# Patient Record
Sex: MALE | Race: WHITE | NOT HISPANIC OR LATINO | Employment: FULL TIME | URBAN - METROPOLITAN AREA
[De-identification: names, ages, dates, MRNs, and addresses within clinical notes are randomized per-mention and may not be internally consistent; named-entity substitution may affect disease eponyms.]

---

## 2017-05-09 ENCOUNTER — HOSPITAL ENCOUNTER (OUTPATIENT)
Dept: LAB | Facility: MEDICAL CENTER | Age: 67
End: 2017-05-09
Attending: OPTOMETRIST
Payer: COMMERCIAL

## 2017-05-09 LAB
ALBUMIN SERPL BCP-MCNC: 4.5 G/DL (ref 3.2–4.9)
ALBUMIN/GLOB SERPL: 1.3 G/DL
ALP SERPL-CCNC: 64 U/L (ref 30–99)
ALT SERPL-CCNC: 42 U/L (ref 2–50)
ANION GAP SERPL CALC-SCNC: 8 MMOL/L (ref 0–11.9)
AST SERPL-CCNC: 28 U/L (ref 12–45)
BASOPHILS # BLD AUTO: 0.6 % (ref 0–1.8)
BASOPHILS # BLD: 0.05 K/UL (ref 0–0.12)
BILIRUB SERPL-MCNC: 0.7 MG/DL (ref 0.1–1.5)
BUN SERPL-MCNC: 20 MG/DL (ref 8–22)
CALCIUM SERPL-MCNC: 9.9 MG/DL (ref 8.5–10.5)
CHLORIDE SERPL-SCNC: 105 MMOL/L (ref 96–112)
CO2 SERPL-SCNC: 24 MMOL/L (ref 20–33)
CREAT SERPL-MCNC: 0.94 MG/DL (ref 0.5–1.4)
EOSINOPHIL # BLD AUTO: 0.08 K/UL (ref 0–0.51)
EOSINOPHIL NFR BLD: 0.9 % (ref 0–6.9)
ERYTHROCYTE [DISTWIDTH] IN BLOOD BY AUTOMATED COUNT: 42.6 FL (ref 35.9–50)
ERYTHROCYTE [SEDIMENTATION RATE] IN BLOOD BY WESTERGREN METHOD: 0 MM/HOUR (ref 0–20)
EST. AVERAGE GLUCOSE BLD GHB EST-MCNC: 134 MG/DL
GFR SERPL CREATININE-BSD FRML MDRD: >60 ML/MIN/1.73 M 2
GLOBULIN SER CALC-MCNC: 3.5 G/DL (ref 1.9–3.5)
GLUCOSE SERPL-MCNC: 92 MG/DL (ref 65–99)
HBA1C MFR BLD: 6.3 % (ref 0–5.6)
HCT VFR BLD AUTO: 50.3 % (ref 42–52)
HGB BLD-MCNC: 16.3 G/DL (ref 14–18)
IMM GRANULOCYTES # BLD AUTO: 0.02 K/UL (ref 0–0.11)
IMM GRANULOCYTES NFR BLD AUTO: 0.2 % (ref 0–0.9)
LYMPHOCYTES # BLD AUTO: 2.51 K/UL (ref 1–4.8)
LYMPHOCYTES NFR BLD: 28 % (ref 22–41)
MCH RBC QN AUTO: 28.5 PG (ref 27–33)
MCHC RBC AUTO-ENTMCNC: 32.4 G/DL (ref 33.7–35.3)
MCV RBC AUTO: 87.9 FL (ref 81.4–97.8)
MONOCYTES # BLD AUTO: 0.67 K/UL (ref 0–0.85)
MONOCYTES NFR BLD AUTO: 7.5 % (ref 0–13.4)
NEUTROPHILS # BLD AUTO: 5.65 K/UL (ref 1.82–7.42)
NEUTROPHILS NFR BLD: 62.8 % (ref 44–72)
NRBC # BLD AUTO: 0 K/UL
NRBC BLD AUTO-RTO: 0 /100 WBC
PLATELET # BLD AUTO: 288 K/UL (ref 164–446)
PMV BLD AUTO: 10.7 FL (ref 9–12.9)
POTASSIUM SERPL-SCNC: 4.5 MMOL/L (ref 3.6–5.5)
PROT SERPL-MCNC: 8 G/DL (ref 6–8.2)
RBC # BLD AUTO: 5.72 M/UL (ref 4.7–6.1)
SODIUM SERPL-SCNC: 137 MMOL/L (ref 135–145)
WBC # BLD AUTO: 9 K/UL (ref 4.8–10.8)

## 2017-05-09 PROCEDURE — 85025 COMPLETE CBC W/AUTO DIFF WBC: CPT

## 2017-05-09 PROCEDURE — 80053 COMPREHEN METABOLIC PANEL: CPT

## 2017-05-09 PROCEDURE — 83036 HEMOGLOBIN GLYCOSYLATED A1C: CPT

## 2017-05-09 PROCEDURE — 36415 COLL VENOUS BLD VENIPUNCTURE: CPT

## 2017-05-09 PROCEDURE — 85652 RBC SED RATE AUTOMATED: CPT

## 2017-09-16 ENCOUNTER — HOSPITAL ENCOUNTER (OUTPATIENT)
Facility: MEDICAL CENTER | Age: 67
End: 2017-09-16
Payer: COMMERCIAL

## 2017-09-16 LAB
ALBUMIN SERPL BCP-MCNC: 4.3 G/DL (ref 3.2–4.9)
ALBUMIN/GLOB SERPL: 1.3 G/DL
ALP SERPL-CCNC: 73 U/L (ref 30–99)
ALT SERPL-CCNC: 41 U/L (ref 2–50)
ANION GAP SERPL CALC-SCNC: 8 MMOL/L (ref 0–11.9)
AST SERPL-CCNC: 25 U/L (ref 12–45)
BDY FAT % MEASURED: 33 %
BILIRUB SERPL-MCNC: 0.7 MG/DL (ref 0.1–1.5)
BP DIAS: 80 MMHG
BP SYS: 140 MMHG
BUN SERPL-MCNC: 13 MG/DL (ref 8–22)
CALCIUM SERPL-MCNC: 9.3 MG/DL (ref 8.5–10.5)
CHLORIDE SERPL-SCNC: 107 MMOL/L (ref 96–112)
CHOLEST SERPL-MCNC: 204 MG/DL (ref 100–199)
CO2 SERPL-SCNC: 23 MMOL/L (ref 20–33)
CREAT SERPL-MCNC: 0.92 MG/DL (ref 0.5–1.4)
DIABETES HTDIA: NO
EVENT NAME HTEVT: NORMAL
GFR SERPL CREATININE-BSD FRML MDRD: >60 ML/MIN/1.73 M 2
GLOBULIN SER CALC-MCNC: 3.3 G/DL (ref 1.9–3.5)
GLUCOSE SERPL-MCNC: 119 MG/DL (ref 65–99)
HDLC SERPL-MCNC: 44 MG/DL
HYPERTENSION HTHYP: NO
LDLC SERPL CALC-MCNC: 135 MG/DL
POTASSIUM SERPL-SCNC: 4.3 MMOL/L (ref 3.6–5.5)
PROT SERPL-MCNC: 7.6 G/DL (ref 6–8.2)
SCREENING LOC CITY HTCIT: NORMAL
SCREENING LOC STATE HTSTA: NORMAL
SCREENING LOCATION HTLOC: NORMAL
SODIUM SERPL-SCNC: 138 MMOL/L (ref 135–145)
SUBSCRIBER ID HTSID: NORMAL
TRIGL SERPL-MCNC: 125 MG/DL (ref 0–149)

## 2018-02-17 ENCOUNTER — OFFICE VISIT (OUTPATIENT)
Dept: URGENT CARE | Facility: CLINIC | Age: 68
End: 2018-02-17
Payer: COMMERCIAL

## 2018-02-17 VITALS
RESPIRATION RATE: 16 BRPM | OXYGEN SATURATION: 96 % | BODY MASS INDEX: 33.59 KG/M2 | TEMPERATURE: 98.2 F | HEIGHT: 72 IN | HEART RATE: 84 BPM | DIASTOLIC BLOOD PRESSURE: 82 MMHG | WEIGHT: 248 LBS | SYSTOLIC BLOOD PRESSURE: 128 MMHG

## 2018-02-17 DIAGNOSIS — J40 BRONCHITIS: ICD-10-CM

## 2018-02-17 PROCEDURE — 99203 OFFICE O/P NEW LOW 30 MIN: CPT | Performed by: PHYSICIAN ASSISTANT

## 2018-02-17 RX ORDER — PROMETHAZINE HYDROCHLORIDE AND CODEINE PHOSPHATE 6.25; 1 MG/5ML; MG/5ML
5-10 SYRUP ORAL 3 TIMES DAILY PRN
Qty: 150 ML | Refills: 0 | Status: SHIPPED | OUTPATIENT
Start: 2018-02-17 | End: 2018-02-22

## 2018-02-17 RX ORDER — CLARITHROMYCIN 500 MG/1
500 TABLET, COATED ORAL 2 TIMES DAILY
Qty: 20 TAB | Refills: 0 | Status: SHIPPED | OUTPATIENT
Start: 2018-02-17 | End: 2018-02-27

## 2018-02-17 ASSESSMENT — ENCOUNTER SYMPTOMS
CARDIOVASCULAR NEGATIVE: 1
SPUTUM PRODUCTION: 1
COUGH: 1
SORE THROAT: 0
EYES NEGATIVE: 1
SHORTNESS OF BREATH: 0
FEVER: 0
CONSTITUTIONAL NEGATIVE: 1

## 2018-02-17 NOTE — PROGRESS NOTES
Subjective:      Noemi Padron is a 67 y.o. male who presents with Cough (x 3 days, some productive cough, little chest congestion and little chills)            Cough   This is a new problem. The current episode started in the past 7 days. The problem has been unchanged. The problem occurs every few minutes. The cough is productive of sputum. Pertinent negatives include no fever, sore throat or shortness of breath. Nothing aggravates the symptoms. He has tried nothing for the symptoms. The treatment provided no relief. There is no history of asthma.       Review of Systems   Constitutional: Negative.  Negative for fever.   HENT: Negative.  Negative for sore throat.    Eyes: Negative.    Respiratory: Positive for cough and sputum production. Negative for shortness of breath.    Cardiovascular: Negative.    Skin: Negative.           Objective:     /82   Pulse 84   Temp 36.8 °C (98.2 °F)   Resp 16   Ht 1.829 m (6')   Wt 112.5 kg (248 lb)   SpO2 96%   BMI 33.63 kg/m²      Physical Exam   Constitutional: He is oriented to person, place, and time. He appears well-developed and well-nourished. No distress.   HENT:   Head: Normocephalic and atraumatic.   Mouth/Throat: Oropharynx is clear and moist.   Eyes: EOM are normal. Pupils are equal, round, and reactive to light.   Neck: Normal range of motion. Neck supple.   Cardiovascular: Normal rate.    Pulmonary/Chest: Effort normal and breath sounds normal. No respiratory distress. He has no wheezes. He has no rales.   Lymphadenopathy:     He has no cervical adenopathy.   Neurological: He is alert and oriented to person, place, and time.   Skin: Skin is warm and dry.   Psychiatric: He has a normal mood and affect. His behavior is normal.   Nursing note and vitals reviewed.    Vitals:    02/17/18 1146   BP: 128/82   Pulse: 84   Resp: 16   Temp: 36.8 °C (98.2 °F)   SpO2: 96%   Weight: 112.5 kg (248 lb)   Height: 1.829 m (6')     Active Ambulatory Problems      Diagnosis Date Noted   • No Active Ambulatory Problems     Resolved Ambulatory Problems     Diagnosis Date Noted   • No Resolved Ambulatory Problems     No Additional Past Medical History     No current outpatient prescriptions on file prior to visit.     No current facility-administered medications on file prior to visit.      Social History     Social History   • Marital status:      Spouse name: N/A   • Number of children: N/A   • Years of education: N/A     Occupational History   • Not on file.     Social History Main Topics   • Smoking status: Not on file   • Smokeless tobacco: Not on file   • Alcohol use Not on file   • Drug use: Unknown   • Sexual activity: Not on file     Other Topics Concern   • Not on file     Social History Narrative   • No narrative on file     History reviewed. No pertinent family history.  Patient has no known allergies.              Assessment/Plan:     ·  bronchitis      · rx abx; otc prn

## 2018-03-28 ENCOUNTER — OFFICE VISIT (OUTPATIENT)
Dept: URGENT CARE | Facility: CLINIC | Age: 68
End: 2018-03-28
Payer: COMMERCIAL

## 2018-03-28 VITALS
DIASTOLIC BLOOD PRESSURE: 78 MMHG | OXYGEN SATURATION: 97 % | HEART RATE: 84 BPM | SYSTOLIC BLOOD PRESSURE: 118 MMHG | HEIGHT: 72 IN | WEIGHT: 223.55 LBS | TEMPERATURE: 98.2 F | RESPIRATION RATE: 20 BRPM | BODY MASS INDEX: 30.28 KG/M2

## 2018-03-28 DIAGNOSIS — L08.9 FINGER INFECTION: ICD-10-CM

## 2018-03-28 PROCEDURE — 99213 OFFICE O/P EST LOW 20 MIN: CPT | Performed by: PHYSICIAN ASSISTANT

## 2018-03-28 RX ORDER — MOXIFLOXACIN HYDROCHLORIDE 400 MG/1
400 TABLET ORAL DAILY
Qty: 3 TAB | Refills: 0 | Status: SHIPPED | OUTPATIENT
Start: 2018-03-28 | End: 2018-03-31

## 2018-03-28 RX ORDER — CLINDAMYCIN HYDROCHLORIDE 300 MG/1
300 CAPSULE ORAL 3 TIMES DAILY
Qty: 21 CAP | Refills: 0 | Status: SHIPPED | OUTPATIENT
Start: 2018-03-28 | End: 2018-04-04

## 2018-03-28 ASSESSMENT — ENCOUNTER SYMPTOMS
WEAKNESS: 0
SWOLLEN GLANDS: 0
FEVER: 0
JOINT SWELLING: 0
MUSCULOSKELETAL NEGATIVE: 1
NUMBNESS: 0
CONSTITUTIONAL NEGATIVE: 1
NEUROLOGICAL NEGATIVE: 1

## 2018-03-28 NOTE — PROGRESS NOTES
Subjective:      Noemi Padron is a 67 y.o. male who presents with Finger Pain (posibble infection)            Other   This is a new problem. The current episode started in the past 7 days (finger infection). The problem occurs constantly. The problem has been unchanged. Pertinent negatives include no fever, joint swelling, numbness, swollen glands or weakness. The symptoms are aggravated by bending. He has tried rest for the symptoms. The treatment provided no relief.       Review of Systems   Constitutional: Negative.  Negative for fever.   Musculoskeletal: Negative.  Negative for joint swelling.   Skin: Negative.    Neurological: Negative.  Negative for weakness and numbness.          Objective:     There were no vitals taken for this visit.     Physical Exam   Constitutional: He is oriented to person, place, and time. He appears well-developed and well-nourished. No distress.   Musculoskeletal: Normal range of motion. He exhibits edema and tenderness.   3rd finger dorsal pip jnt, redn/sw/tend; 1%lidoc local; poked w/18ga, no pus, just bld; abx dssg   Neurological: He is alert and oriented to person, place, and time. He exhibits normal muscle tone. Coordination normal.   Skin: Skin is warm and dry. There is erythema.   Psychiatric: He has a normal mood and affect. His behavior is normal.   Nursing note and vitals reviewed.    Active Ambulatory Problems     Diagnosis Date Noted   • No Active Ambulatory Problems     Resolved Ambulatory Problems     Diagnosis Date Noted   • No Resolved Ambulatory Problems     No Additional Past Medical History     No current outpatient prescriptions on file prior to visit.     No current facility-administered medications on file prior to visit.      Social History     Social History   • Marital status:      Spouse name: N/A   • Number of children: N/A   • Years of education: N/A     Occupational History   • Not on file.     Social History Main Topics   • Smoking status:  Never Smoker   • Smokeless tobacco: Never Used   • Alcohol use No   • Drug use: No   • Sexual activity: Not on file     Other Topics Concern   • Not on file     Social History Narrative   • No narrative on file     History reviewed. No pertinent family history.  Patient has no known allergies.            Assessment/Plan:     ·  finger inf      · rx abx, soaks

## 2021-03-03 DIAGNOSIS — Z23 NEED FOR VACCINATION: ICD-10-CM

## 2021-03-10 ENCOUNTER — OFFICE VISIT (OUTPATIENT)
Dept: URGENT CARE | Facility: CLINIC | Age: 71
End: 2021-03-10
Payer: COMMERCIAL

## 2021-03-10 VITALS
HEIGHT: 72 IN | RESPIRATION RATE: 18 BRPM | OXYGEN SATURATION: 96 % | WEIGHT: 224 LBS | DIASTOLIC BLOOD PRESSURE: 94 MMHG | SYSTOLIC BLOOD PRESSURE: 172 MMHG | BODY MASS INDEX: 30.34 KG/M2 | TEMPERATURE: 97.1 F | HEART RATE: 78 BPM

## 2021-03-10 DIAGNOSIS — G51.0 BELL'S PALSY: ICD-10-CM

## 2021-03-10 DIAGNOSIS — R03.0 ELEVATED BLOOD-PRESSURE READING WITHOUT DIAGNOSIS OF HYPERTENSION: ICD-10-CM

## 2021-03-10 PROCEDURE — 99214 OFFICE O/P EST MOD 30 MIN: CPT | Performed by: STUDENT IN AN ORGANIZED HEALTH CARE EDUCATION/TRAINING PROGRAM

## 2021-03-10 RX ORDER — PREDNISONE 20 MG/1
TABLET ORAL
Qty: 20 TABLET | Refills: 0 | Status: SHIPPED | OUTPATIENT
Start: 2021-03-10 | End: 2024-02-14

## 2021-03-10 NOTE — PROGRESS NOTES
Subjective:   CHIEF COMPLAINT  Chief Complaint   Patient presents with   • Facial Droop     x1day, left side of face, drooping, no muscle control       HPI  Noemi Padron is a 70 y.o. male who presents with a chief complaint of left sided facial weakness, including his forehead, which started yesterday.  Patient says he has been experiencing some difficulty eating and drinking with some diminished sense of taste on left side.  He has been having to manually close his left eye.  The patient wears hearing aids but has not had any changes or loss of hearing.  No problems with speech.  No changes in vision.  No weakness or loss of sensation in his bilateral upper or lower extremities.  No new rashes.    Additionally the patient had elevated blood pressure on arrival.  Patient denies a previous history of hypertension but does not have a PCP, would like to establish.    REVIEW OF SYSTEMS  General: no fever or chills  GI: no nausea or vomiting  See HPI for further details.    PAST MEDICAL HISTORY  There are no problems to display for this patient.      SURGICAL HISTORY  patient denies any surgical history    ALLERGIES  No Known Allergies    CURRENT MEDICATIONS  Home Medications     Reviewed by George Bob D.O. (Physician) on 03/10/21 at [email or web address]  Med List Status: <None>   Medication Last Dose Status        Patient Jun Taking any Medications                       SOCIAL HISTORY  Social History     Tobacco Use   • Smoking status: Never Smoker   • Smokeless tobacco: Never Used   Substance and Sexual Activity   • Alcohol use: No   • Drug use: No   • Sexual activity: Not on file       FAMILY HISTORY  No family history on file.       Objective:   PHYSICAL EXAM  VITAL SIGNS: BP (!) 172/94 (BP Location: Left arm, Patient Position: Sitting, BP Cuff Size: Adult)   Pulse 78   Temp 36.2 °C (97.1 °F) (Temporal)   Resp 18   Ht 1.829 m (6')   Wt 102 kg (224 lb)   SpO2 96%   BMI 30.38 kg/m²     Gen: no acute distress,  "normal voice  Skin: dry, intact, moist mucosal membranes  ENT: No lesions or rashes in the external ear canals bilaterally.  TMs clear and intact.  Lungs: CTAB w/ symmetric expansion  CV: RRR w/o murmurs or clicks  Neuro: Speech: conversant, fluent, no aphasia.  Able to repeat \"the lara is blue.\"  Mental status: AAOx3  Gait: Antalgic 2/2 right knee DJD (planning to get TKA next week).  Gait was steady.  CN: Left ipsilateral cranial nerve VII dysfunction.  Remaining cranial nerves II through XII were fully intact.    Sensory exam: globally intact bilateral upper and lower extremities  Motor exam: no global deficits bilateral upper and lower extremities  Psych: normal affect, normal judgement, alert, awake    Assessment/Plan:     1. Bell's palsy  predniSONE (DELTASONE) 20 MG Tab    REFERRAL TO FOLLOW-UP WITH PRIMARY CARE   2. 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  Remaining neurologic exam was completely unremarkable.  No additional peripheral or any central neurologic deficits.  -Rx sent for prednisone 20 mg twice daily x10 days  -Eyelid was taped down in the clinic and provided 2 rolls of tape  -Instructed to purchase GenTeal eye gel and to use daily and before going to bed  -Instructed to purchase an eye patch  -Discussed red flags and emergency room precautions.  Patient verbalized understanding.  All questions were answered.    2) pressure elevated today.  The patient reports no previous history of hypertension.  He is not established with a PCP and would like referral  -Referral sent to establish with primary care      Differential diagnosis, natural history, supportive care, and indications for immediate follow-up discussed. All questions answered. Patient agrees with the plan of care.    Follow-up as needed if symptoms worsen or fail to improve to PCP, " Urgent care or Emergency Room.    Please note that this dictation was created using voice recognition software. I have made a reasonable attempt to correct obvious errors, but I expect that there are errors of grammar and possibly content that I did not discover before finalizing the note.

## 2021-03-26 ENCOUNTER — HOSPITAL ENCOUNTER (OUTPATIENT)
Dept: LAB | Facility: MEDICAL CENTER | Age: 71
End: 2021-03-26
Attending: ANESTHESIOLOGY
Payer: COMMERCIAL

## 2021-03-26 LAB
COVID ORDER STATUS COVID19: NORMAL
SARS-COV-2 RNA RESP QL NAA+PROBE: NOTDETECTED
SPECIMEN SOURCE: NORMAL

## 2021-03-26 PROCEDURE — C9803 HOPD COVID-19 SPEC COLLECT: HCPCS

## 2021-03-26 PROCEDURE — U0003 INFECTIOUS AGENT DETECTION BY NUCLEIC ACID (DNA OR RNA); SEVERE ACUTE RESPIRATORY SYNDROME CORONAVIRUS 2 (SARS-COV-2) (CORONAVIRUS DISEASE [COVID-19]), AMPLIFIED PROBE TECHNIQUE, MAKING USE OF HIGH THROUGHPUT TECHNOLOGIES AS DESCRIBED BY CMS-2020-01-R: HCPCS

## 2021-03-26 PROCEDURE — U0005 INFEC AGEN DETEC AMPLI PROBE: HCPCS

## 2021-04-04 ENCOUNTER — HOSPITAL ENCOUNTER (EMERGENCY)
Facility: MEDICAL CENTER | Age: 71
End: 2021-04-04
Attending: EMERGENCY MEDICINE
Payer: COMMERCIAL

## 2021-04-04 ENCOUNTER — APPOINTMENT (OUTPATIENT)
Dept: RADIOLOGY | Facility: MEDICAL CENTER | Age: 71
End: 2021-04-04
Attending: EMERGENCY MEDICINE
Payer: COMMERCIAL

## 2021-04-04 VITALS
WEIGHT: 222.66 LBS | OXYGEN SATURATION: 98 % | HEIGHT: 72 IN | RESPIRATION RATE: 16 BRPM | HEART RATE: 68 BPM | TEMPERATURE: 98.2 F | DIASTOLIC BLOOD PRESSURE: 73 MMHG | BODY MASS INDEX: 30.16 KG/M2 | SYSTOLIC BLOOD PRESSURE: 155 MMHG

## 2021-04-04 DIAGNOSIS — G89.18 POST-OP PAIN: ICD-10-CM

## 2021-04-04 PROCEDURE — 93971 EXTREMITY STUDY: CPT | Mod: 26 | Performed by: INTERNAL MEDICINE

## 2021-04-04 PROCEDURE — 93971 EXTREMITY STUDY: CPT | Mod: RT

## 2021-04-04 PROCEDURE — 700111 HCHG RX REV CODE 636 W/ 250 OVERRIDE (IP): Performed by: EMERGENCY MEDICINE

## 2021-04-04 PROCEDURE — 99284 EMERGENCY DEPT VISIT MOD MDM: CPT | Mod: EDC

## 2021-04-04 RX ORDER — OXYCODONE AND ACETAMINOPHEN TABLETS 5; 300 MG/1; MG/1
1 TABLET ORAL EVERY 4 HOURS PRN
Status: SHIPPED | COMMUNITY
End: 2024-02-14

## 2021-04-04 RX ORDER — MELOXICAM 7.5 MG/1
7.5 TABLET ORAL DAILY
Status: SHIPPED | COMMUNITY
End: 2024-02-14

## 2021-04-04 RX ORDER — ASPIRIN 81 MG/1
81 TABLET, CHEWABLE ORAL DAILY
Status: SHIPPED | COMMUNITY
End: 2024-02-14

## 2021-04-04 RX ADMIN — FENTANYL CITRATE 50 MCG: 50 INJECTION, SOLUTION INTRAMUSCULAR; INTRAVENOUS at 09:12

## 2021-04-04 NOTE — ED NOTES
Rounded with patient, states that he is comfortable and that he is currently not in pain.  He and his wife deny needs at this time.  Call light in place.

## 2021-04-04 NOTE — ED NOTES
Leroy Padron has been discharged from the Emergency Room.    Discharge instructions, which include signs and symptoms to monitor at home for, as well as detailed information regarding post op pain provided.  All questions and concerns addressed at this time.      Patient leaves ER in no apparent distress. This RN provided education regarding returning to the ER for any new concerns or changes in patient's condition.      /73   Pulse 68   Temp 36.8 °C (98.2 °F) (Temporal)   Resp 16   Ht 1.829 m (6')   Wt 101 kg (222 lb 10.6 oz)   SpO2 98%   BMI 30.20 kg/m²

## 2021-04-04 NOTE — ED PROVIDER NOTES
ED Provider Note    CHIEF COMPLAINT  Chief Complaint   Patient presents with   • Post-Op Complications     total right knee replacement on friday pt c/o increased pain and swelling to knee and calf        HPI  Leroy Padron is a 70 y.o. male who presents with right knee pain and swelling.  The patient had a total knee replacement on Friday.  He states he did well on Saturday but today has been having a lot of pain.  He also has some swelling distally.  He states the pain is worse with going into full extension.  He has not had any associated fevers.  He has not any cough nor difficulty with breathing.    REVIEW OF SYSTEMS  No fever, no nausea or vomiting    PHYSICAL EXAM  VITAL SIGNS: /86   Pulse 75   Temp 35.9 °C (96.6 °F) (Temporal)   Resp 18   Ht 1.829 m (6')   Wt 101 kg (222 lb 10.6 oz)   SpO2 93%   BMI 30.20 kg/m²   In general the patient does not appear toxic    Extremities the right knee is swollen with an apparent effusion.  He does have limited range of motion due to pain.  There is no skeletal deformities.    Skin no erythema nor induration    Neurovascular examination to the right lower extremity is grossly intact    RADIOLOGY/PROCEDURES  Ultrasound shows no evidence of a DVT    COURSE & MEDICAL DECISION MAKING  Pertinent Labs & Imaging studies reviewed. (See chart for details)  This a 70-year-old gentleman who presents with postoperative knee pain and swelling.  Clinically do not appreciate any evidence of an infection and based on the timeframe this would be less likely.  Ultrasound does not show any evidence of a DVT.  I suspect this is all postoperative and the patient had some decompensation as his nerve block has worn off.  The patient will continue his anti-inflammatories and hydrocodone for pain control.  The patient will return to the emerge department for increased pain, fever, or swelling.  He will follow up with his orthopedic surgeon as scheduled.    FINAL IMPRESSION  1.   Postoperative right knee pain       Disposition  The patient will be discharged in stable condition      Electronically signed by: Nuno Sahu M.D., 4/4/2021 9:06 AM

## 2021-04-04 NOTE — ED NOTES
"First interaction with patient.  Assumed care at this time.  Patient presents to the ER today for complaint of right knee pain and swelling.  He states that he had a total knee replacement by Dr. Hull 2 days ago.  He denies numbness or tingling to his right foot, states that his calf \"feels tight.\"  Denies shortness of breath or fever.    Gown provided, patient instructed to changed prior to ERP evaluation.  NPO status explained by this RN.  Call light provided.  Chart up for ERP.    This RN provided education to patient about the importance of keeping mask in place over both mouth and nose for entire duration of ER visit.  "

## 2021-04-04 NOTE — DISCHARGE INSTRUCTIONS
Continue your current pain medication and anti-inflammatories    Follow-up with your surgeon as scheduled

## 2021-04-04 NOTE — ED TRIAGE NOTES
Pt to triage .  Chief Complaint   Patient presents with   • Post-Op Complications     total right knee replacement on friday pt c/o increased pain and swelling to knee and calf

## 2022-06-18 ENCOUNTER — TELEPHONE (OUTPATIENT)
Dept: HEALTH INFORMATION MANAGEMENT | Facility: OTHER | Age: 72
End: 2022-06-18

## 2022-08-18 ENCOUNTER — TELEPHONE (OUTPATIENT)
Dept: SCHEDULING | Facility: IMAGING CENTER | Age: 72
End: 2022-08-18

## 2022-09-01 NOTE — TELEPHONE ENCOUNTER
Outcome: Left Message    Please transfer to Patient Outreach Team at [phone] when patient returns call.      Attempt # [phone]

## 2022-11-15 ENCOUNTER — DOCUMENTATION (OUTPATIENT)
Dept: HEALTH INFORMATION MANAGEMENT | Facility: OTHER | Age: 72
End: 2022-11-15

## 2023-12-26 ENCOUNTER — TELEPHONE (OUTPATIENT)
Dept: HEALTH INFORMATION MANAGEMENT | Facility: OTHER | Age: 73
End: 2023-12-26

## 2024-02-11 SDOH — ECONOMIC STABILITY: HOUSING INSECURITY
IN THE LAST 12 MONTHS, WAS THERE A TIME WHEN YOU DID NOT HAVE A STEADY PLACE TO SLEEP OR SLEPT IN A SHELTER (INCLUDING NOW)?: NO

## 2024-02-11 SDOH — ECONOMIC STABILITY: TRANSPORTATION INSECURITY
IN THE PAST 12 MONTHS, HAS LACK OF RELIABLE TRANSPORTATION KEPT YOU FROM MEDICAL APPOINTMENTS, MEETINGS, WORK OR FROM GETTING THINGS NEEDED FOR DAILY LIVING?: NO

## 2024-02-11 SDOH — ECONOMIC STABILITY: INCOME INSECURITY: IN THE LAST 12 MONTHS, WAS THERE A TIME WHEN YOU WERE NOT ABLE TO PAY THE MORTGAGE OR RENT ON TIME?: NO

## 2024-02-11 SDOH — HEALTH STABILITY: PHYSICAL HEALTH: ON AVERAGE, HOW MANY MINUTES DO YOU ENGAGE IN EXERCISE AT THIS LEVEL?: PATIENT DECLINED

## 2024-02-11 SDOH — ECONOMIC STABILITY: TRANSPORTATION INSECURITY
IN THE PAST 12 MONTHS, HAS THE LACK OF TRANSPORTATION KEPT YOU FROM MEDICAL APPOINTMENTS OR FROM GETTING MEDICATIONS?: NO

## 2024-02-11 SDOH — ECONOMIC STABILITY: FOOD INSECURITY: WITHIN THE PAST 12 MONTHS, THE FOOD YOU BOUGHT JUST DIDN'T LAST AND YOU DIDN'T HAVE MONEY TO GET MORE.: NEVER TRUE

## 2024-02-11 SDOH — ECONOMIC STABILITY: FOOD INSECURITY: WITHIN THE PAST 12 MONTHS, YOU WORRIED THAT YOUR FOOD WOULD RUN OUT BEFORE YOU GOT MONEY TO BUY MORE.: NEVER TRUE

## 2024-02-11 SDOH — HEALTH STABILITY: PHYSICAL HEALTH
ON AVERAGE, HOW MANY DAYS PER WEEK DO YOU ENGAGE IN MODERATE TO STRENUOUS EXERCISE (LIKE A BRISK WALK)?: PATIENT DECLINED

## 2024-02-11 SDOH — ECONOMIC STABILITY: INCOME INSECURITY: HOW HARD IS IT FOR YOU TO PAY FOR THE VERY BASICS LIKE FOOD, HOUSING, MEDICAL CARE, AND HEATING?: NOT HARD AT ALL

## 2024-02-11 SDOH — ECONOMIC STABILITY: TRANSPORTATION INSECURITY
IN THE PAST 12 MONTHS, HAS LACK OF TRANSPORTATION KEPT YOU FROM MEETINGS, WORK, OR FROM GETTING THINGS NEEDED FOR DAILY LIVING?: NO

## 2024-02-11 SDOH — ECONOMIC STABILITY: HOUSING INSECURITY: IN THE LAST 12 MONTHS, HOW MANY PLACES HAVE YOU LIVED?: 1

## 2024-02-11 SDOH — HEALTH STABILITY: MENTAL HEALTH
STRESS IS WHEN SOMEONE FEELS TENSE, NERVOUS, ANXIOUS, OR CAN'T SLEEP AT NIGHT BECAUSE THEIR MIND IS TROUBLED. HOW STRESSED ARE YOU?: NOT AT ALL

## 2024-02-11 ASSESSMENT — SOCIAL DETERMINANTS OF HEALTH (SDOH)
HOW OFTEN DO YOU HAVE A DRINK CONTAINING ALCOHOL: 4 OR MORE TIMES A WEEK
HOW HARD IS IT FOR YOU TO PAY FOR THE VERY BASICS LIKE FOOD, HOUSING, MEDICAL CARE, AND HEATING?: NOT HARD AT ALL
HOW OFTEN DO YOU HAVE SIX OR MORE DRINKS ON ONE OCCASION: NEVER
HOW OFTEN DO YOU ATTENT MEETINGS OF THE CLUB OR ORGANIZATION YOU BELONG TO?: NEVER
HOW OFTEN DO YOU GET TOGETHER WITH FRIENDS OR RELATIVES?: PATIENT DECLINED
HOW OFTEN DO YOU ATTEND CHURCH OR RELIGIOUS SERVICES?: NEVER
HOW MANY DRINKS CONTAINING ALCOHOL DO YOU HAVE ON A TYPICAL DAY WHEN YOU ARE DRINKING: 1 OR 2
IN A TYPICAL WEEK, HOW MANY TIMES DO YOU TALK ON THE PHONE WITH FAMILY, FRIENDS, OR NEIGHBORS?: MORE THAN THREE TIMES A WEEK
HOW OFTEN DO YOU ATTENT MEETINGS OF THE CLUB OR ORGANIZATION YOU BELONG TO?: NEVER
WITHIN THE PAST 12 MONTHS, YOU WORRIED THAT YOUR FOOD WOULD RUN OUT BEFORE YOU GOT THE MONEY TO BUY MORE: NEVER TRUE
HOW OFTEN DO YOU GET TOGETHER WITH FRIENDS OR RELATIVES?: PATIENT DECLINED
IN A TYPICAL WEEK, HOW MANY TIMES DO YOU TALK ON THE PHONE WITH FAMILY, FRIENDS, OR NEIGHBORS?: MORE THAN THREE TIMES A WEEK
HOW OFTEN DO YOU ATTEND CHURCH OR RELIGIOUS SERVICES?: NEVER

## 2024-02-11 ASSESSMENT — LIFESTYLE VARIABLES
HOW MANY STANDARD DRINKS CONTAINING ALCOHOL DO YOU HAVE ON A TYPICAL DAY: 1 OR 2
HOW OFTEN DO YOU HAVE A DRINK CONTAINING ALCOHOL: 4 OR MORE TIMES A WEEK
HOW OFTEN DO YOU HAVE SIX OR MORE DRINKS ON ONE OCCASION: NEVER
SKIP TO QUESTIONS 9-10: 1
AUDIT-C TOTAL SCORE: 4

## 2024-02-14 ENCOUNTER — OFFICE VISIT (OUTPATIENT)
Dept: MEDICAL GROUP | Facility: PHYSICIAN GROUP | Age: 74
End: 2024-02-14
Payer: MEDICARE

## 2024-02-14 VITALS
DIASTOLIC BLOOD PRESSURE: 76 MMHG | TEMPERATURE: 97.8 F | OXYGEN SATURATION: 97 % | WEIGHT: 212.2 LBS | HEART RATE: 63 BPM | BODY MASS INDEX: 29.71 KG/M2 | SYSTOLIC BLOOD PRESSURE: 122 MMHG | HEIGHT: 71 IN

## 2024-02-14 DIAGNOSIS — R73.03 PREDIABETES: ICD-10-CM

## 2024-02-14 DIAGNOSIS — M25.511 ACUTE PAIN OF RIGHT SHOULDER: ICD-10-CM

## 2024-02-14 DIAGNOSIS — Z91.81 RISK FOR FALLS: ICD-10-CM

## 2024-02-14 DIAGNOSIS — Z12.12 SCREENING FOR COLORECTAL CANCER: ICD-10-CM

## 2024-02-14 DIAGNOSIS — Z12.11 SCREENING FOR COLORECTAL CANCER: ICD-10-CM

## 2024-02-14 DIAGNOSIS — E78.00 PURE HYPERCHOLESTEROLEMIA: ICD-10-CM

## 2024-02-14 PROCEDURE — 99214 OFFICE O/P EST MOD 30 MIN: CPT

## 2024-02-14 PROCEDURE — 3078F DIAST BP <80 MM HG: CPT

## 2024-02-14 PROCEDURE — 3074F SYST BP LT 130 MM HG: CPT

## 2024-02-14 ASSESSMENT — ENCOUNTER SYMPTOMS
SHORTNESS OF BREATH: 0
ABDOMINAL PAIN: 0
BLURRED VISION: 0
WEIGHT LOSS: 0
FEVER: 0
CONSTIPATION: 0
DIARRHEA: 0
HEADACHES: 0
MYALGIAS: 0
CHILLS: 0
VOMITING: 0
WEAKNESS: 0
NAUSEA: 0
DIZZINESS: 0
COUGH: 0

## 2024-02-14 ASSESSMENT — PATIENT HEALTH QUESTIONNAIRE - PHQ9: CLINICAL INTERPRETATION OF PHQ2 SCORE: 0

## 2024-02-14 NOTE — ASSESSMENT & PLAN NOTE
Use this checklist to minimize fall risk  Outside your home  _ [place] outside stairs with a mixture of sand and paint for better traction.   _ Keep outdoor walkways clear and well-lit.  _ Clear snow and ice and overgrown plants from entrances and sidewalks.  Inside your home  _ Remove all extraneous clutter in your house.  _ Keep telephone and electrical cords out of pathways.  _ Tack rugs and glue vinyl dez so they lie flat. Remove or replace rugs or runners that tend to slip, or attach non-slip backing.  _ Ensure that carpets are firmly attached to the stairs.  _ Do not stand on a chair to reach things.   _ Store frequently used objects where you can reach them easily.  Keep a well-lit home  _ Have a lamp or light switch that you can easily reach without getting out of bed.  _ Use night lights in the bedroom, bathroom and hallways.  _ Keep a flashlight handy.  _ Have light switches at both ends of stairs and halls. Install handrails.  _ Turn on the lights when you go into the house at night.  Bathroom tips  _ Add grab bars in shower, tub and toilet areas.  _ Use nonslip adhesive strips or a mat in shower or tub.  _ Consider sitting on a bench or stool in the shower.  _ Consider using an elevated toilet seat.  Use care walking  _ Use helping devices, such as canes, as directed by your healthcare provider.  _ Wear nonslip, low-heeled shoes or slippers that fit snugly.  _ Avoid walking in stocking feet.  And don’t forget...  _ Review medications with your doctor or pharmacist. Some drugs, including over the counter drugs, can make you drowsy, dizzy and unsteady.  _ Have your hearing and eyesight tested. Inner ear problems can affect balance. Vision problems make it difficult to see potential hazards.  _ Discuss safe amounts of alcohol intake with your physician.  _ Exercise regularly to improve muscle flexibility, strength, and balance.  _ If you feel dizzy or lightheaded, sit down or stay seated until your head  clears.   _ Stand up slowly to avoid unsteadiness.

## 2024-02-14 NOTE — ASSESSMENT & PLAN NOTE
Chronic of uncertain progression  We will recheck A1c next lab draw  -Exercise: At least 150 minutes of moderate aerobic activity per week or 75 minutes of vigorous aerobic activity per week, +2 days/week of strength training  - Healthy lifestyle and eating habits: Mediterranean-based diet (rich in fruits, vegetables, nuts and healthy oils), proper hydration and avoiding sugary beverages, adequate sleep hygiene-(allowing 7 to 8 hours of overnight sleep).

## 2024-02-14 NOTE — PROGRESS NOTES
Subjective:     CC:  Diagnoses of Acute pain of right shoulder, Prediabetes, Pure hypercholesterolemia, Screening for colorectal cancer, and Risk for falls were pertinent to this visit.    HISTORY OF THE PRESENT ILLNESS: Patient is a 73 y.o. male. This pleasant patient is here today to establish care and discuss the following problems:    Problem   Acute Pain of Right Shoulder    Had a fall in December 2023 and stepped wrong on the ice, landed on outstretched arm/shoulder. He has seen at [facility] and imaging was done, did not reveal fracture. Still having difficulty with pain, lifting and ROM.      Risk for Falls   Pure Hypercholesterolemia    Not currently on a statin  He does endorse healthy diet mostly and exercises recreationally with skiing  Lab Results   Component Value Date/Time    CHOLSTRLTOT 204 (H) 09/16/2017 07:54 AM    CHOLSTRLTOT 210 (H) 09/13/2014 09:59 AM     (H) 09/16/2017 07:54 AM     (H) 09/13/2014 09:59 AM    HDL 44 09/16/2017 07:54 AM    HDL 46 09/13/2014 09:59 AM    TRIGLYCERIDE 125 09/16/2017 07:54 AM    TRIGLYCERIDE 142 09/13/2014 09:59 AM          Screening for Colorectal Cancer   Prediabetes    Not currently on medication  Lab Results   Component Value Date/Time    HBA1C 6.3 (H) 05/09/2017 11:00 AM   No recent A1c         Health Maintenance: declines vaccine    ROS:   Review of Systems   Constitutional:  Negative for chills, fever, malaise/fatigue and weight loss.   Eyes:  Negative for blurred vision.   Respiratory:  Negative for cough and shortness of breath.    Cardiovascular:  Negative for chest pain.   Gastrointestinal:  Negative for abdominal pain, constipation, diarrhea, nausea and vomiting.   Musculoskeletal:  Positive for joint pain. Negative for myalgias.   Neurological:  Negative for dizziness, weakness and headaches.         Objective:     Exam: /76 (BP Location: Left arm, Patient Position: Sitting, BP Cuff Size: Adult)   Pulse 63   Temp 36.6 °C (97.8 °F)  "(Temporal)   Ht 1.803 m (5' 11\")   Wt 96.3 kg (212 lb 3.2 oz)   SpO2 97%  Body mass index is 29.6 kg/m².    Physical Exam  Constitutional:       Appearance: Normal appearance.   HENT:      Head: Normocephalic.   Eyes:      Conjunctiva/sclera: Conjunctivae normal.      Pupils: Pupils are equal, round, and reactive to light.   Cardiovascular:      Rate and Rhythm: Normal rate and regular rhythm.      Heart sounds: No murmur heard.  Pulmonary:      Effort: Pulmonary effort is normal. No respiratory distress.      Breath sounds: Normal breath sounds.   Musculoskeletal:         General: Normal range of motion.   Skin:     General: Skin is warm and dry.   Neurological:      General: No focal deficit present.      Mental Status: He is alert and oriented to person, place, and time.   Psychiatric:         Mood and Affect: Mood normal.         Behavior: Behavior normal.           Inspection: no atrophy, deformity, skin lesions, surgical scars.   Passive ROM: forward flexion (160`), external rotation (60`) internal rotation (T12)  Rotator Cuff strength: Supraspinatus 4/5, Drop Arm Sign absent, External rotation 5/5, Lift off able.  AC Joint/Impingement: No tenderness AC, Cor[place]ord. Cross arm test without pain at AC joint   Biceps: no tenderness at bicipital groove. Sensation is grossly intact.  2+ radial pulse.   Elbow: No gross tenderness to the medial or lateral epicondyl or radial head. No obvious deformity of the elbow.       Labs: No recent labs    Assessment & Plan: Medical Decision Making   73 y.o. male with the following -    Problem List Items Addressed This Visit       Acute pain of right shoulder     Subacute, complicated due to traumatic nature of event  Highly suspicious for RCT after exam findings. MRI ordered and referral placed to orthopedics for further evaluation         Relevant Orders    MR-SHOULDER-W/O RIGHT    Referral to Orthopedics    Risk for falls     Use this checklist to minimize fall " risk  Outside your home  _ [place] outside stairs with a mixture of sand and paint for better traction.   _ Keep outdoor walkways clear and well-lit.  _ Clear snow and ice and overgrown plants from entrances and sidewalks.  Inside your home  _ Remove all extraneous clutter in your house.  _ Keep telephone and electrical cords out of pathways.  _ Tack rugs and glue vinyl dez so they lie flat. Remove or replace rugs or runners that tend to slip, or attach non-slip backing.  _ Ensure that carpets are firmly attached to the stairs.  _ Do not stand on a chair to reach things.   _ Store frequently used objects where you can reach them easily.  Keep a well-lit home  _ Have a lamp or light switch that you can easily reach without getting out of bed.  _ Use night lights in the bedroom, bathroom and hallways.  _ Keep a flashlight handy.  _ Have light switches at both ends of stairs and halls. Install handrails.  _ Turn on the lights when you go into the house at night.  Bathroom tips  _ Add grab bars in shower, tub and toilet areas.  _ Use nonslip adhesive strips or a mat in shower or tub.  _ Consider sitting on a bench or stool in the shower.  _ Consider using an elevated toilet seat.  Use care walking  _ Use helping devices, such as canes, as directed by your healthcare provider.  _ Wear nonslip, low-heeled shoes or slippers that fit snugly.  _ Avoid walking in stocking feet.  And don’t forget...  _ Review medications with your doctor or pharmacist. Some drugs, including over the counter drugs, can make you drowsy, dizzy and unsteady.  _ Have your hearing and eyesight tested. Inner ear problems can affect balance. Vision problems make it difficult to see potential hazards.  _ Discuss safe amounts of alcohol intake with your physician.  _ Exercise regularly to improve muscle flexibility, strength, and balance.  _ If you feel dizzy or lightheaded, sit down or stay seated until your head clears.   _ Stand up slowly to avoid  unsteadiness.           Relevant Orders    Patient identified as fall risk.  Appropriate orders and counseling given.    Pure hypercholesterolemia     Chronic uncertain progression  Repeat lipid panel next lab draw  Recommend diligent efforts towards healthy lifestyle as discussed         Relevant Orders    Comp Metabolic Panel    Lipid Profile    Screening for colorectal cancer    Relevant Orders    Referral to GI for Colonoscopy    Prediabetes     Chronic of uncertain progression  We will recheck A1c next lab draw  -Exercise: At least 150 minutes of moderate aerobic activity per week or 75 minutes of vigorous aerobic activity per week, +2 days/week of strength training  - Healthy lifestyle and eating habits: Mediterranean-based diet (rich in fruits, vegetables, nuts and healthy oils), proper hydration and avoiding sugary beverages, adequate sleep hygiene-(allowing 7 to 8 hours of overnight sleep).           Relevant Orders    HEMOGLOBIN A1C    Comp Metabolic Panel       Differential diagnosis, natural history, supportive care, and indications for immediate follow-up discussed.  Shared decision making approach was utilized, and patient is amendable with plan of care.  Patient understands to return to clinic or go to the emergency department if symptoms worsen. All questions and concerns addressed to the best of my knowledge.      Return in about 6 months (around 8/14/2024), or if symptoms worsen or fail to improve, for F/U Lab Review.    Please note that this dictation was created using voice recognition software. I have made every reasonable attempt to correct obvious errors, but I expect that there are errors of grammar and possibly content that I did not discover before finalizing the note.

## 2024-02-14 NOTE — ASSESSMENT & PLAN NOTE
Subacute, complicated due to traumatic nature of event  Highly suspicious for RCT after exam findings. MRI ordered and referral placed to orthopedics for further evaluation

## 2024-02-14 NOTE — ASSESSMENT & PLAN NOTE
Chronic uncertain progression  Repeat lipid panel next lab draw  Recommend diligent efforts towards healthy lifestyle as discussed

## 2024-02-16 ENCOUNTER — TELEPHONE (OUTPATIENT)
Dept: HEALTH INFORMATION MANAGEMENT | Facility: OTHER | Age: 74
End: 2024-02-16
Payer: MEDICARE

## 2024-02-16 ENCOUNTER — HOSPITAL ENCOUNTER (OUTPATIENT)
Dept: LAB | Facility: MEDICAL CENTER | Age: 74
End: 2024-02-16
Payer: MEDICARE

## 2024-02-16 DIAGNOSIS — R73.03 PREDIABETES: ICD-10-CM

## 2024-02-16 DIAGNOSIS — E78.00 PURE HYPERCHOLESTEROLEMIA: ICD-10-CM

## 2024-02-16 LAB
ALBUMIN SERPL BCP-MCNC: 4.2 G/DL (ref 3.2–4.9)
ALBUMIN/GLOB SERPL: 1.4 G/DL
ALP SERPL-CCNC: 96 U/L (ref 30–99)
ALT SERPL-CCNC: 46 U/L (ref 2–50)
ANION GAP SERPL CALC-SCNC: 13 MMOL/L (ref 7–16)
AST SERPL-CCNC: 32 U/L (ref 12–45)
BILIRUB SERPL-MCNC: 0.5 MG/DL (ref 0.1–1.5)
BUN SERPL-MCNC: 14 MG/DL (ref 8–22)
CALCIUM ALBUM COR SERPL-MCNC: 9.1 MG/DL (ref 8.5–10.5)
CALCIUM SERPL-MCNC: 9.3 MG/DL (ref 8.5–10.5)
CHLORIDE SERPL-SCNC: 104 MMOL/L (ref 96–112)
CHOLEST SERPL-MCNC: 184 MG/DL (ref 100–199)
CO2 SERPL-SCNC: 24 MMOL/L (ref 20–33)
CREAT SERPL-MCNC: 0.86 MG/DL (ref 0.5–1.4)
EST. AVERAGE GLUCOSE BLD GHB EST-MCNC: 192 MG/DL
FASTING STATUS PATIENT QL REPORTED: NORMAL
GFR SERPLBLD CREATININE-BSD FMLA CKD-EPI: 92 ML/MIN/1.73 M 2
GLOBULIN SER CALC-MCNC: 3.1 G/DL (ref 1.9–3.5)
GLUCOSE SERPL-MCNC: 207 MG/DL (ref 65–99)
HBA1C MFR BLD: 8.3 % (ref 4–5.6)
HDLC SERPL-MCNC: 38 MG/DL
LDLC SERPL CALC-MCNC: 123 MG/DL
POTASSIUM SERPL-SCNC: 4.6 MMOL/L (ref 3.6–5.5)
PROT SERPL-MCNC: 7.3 G/DL (ref 6–8.2)
SODIUM SERPL-SCNC: 141 MMOL/L (ref 135–145)
TRIGL SERPL-MCNC: 113 MG/DL (ref 0–149)

## 2024-02-16 PROCEDURE — 80053 COMPREHEN METABOLIC PANEL: CPT

## 2024-02-16 PROCEDURE — 83036 HEMOGLOBIN GLYCOSYLATED A1C: CPT

## 2024-02-16 PROCEDURE — 36415 COLL VENOUS BLD VENIPUNCTURE: CPT

## 2024-02-16 PROCEDURE — 80061 LIPID PANEL: CPT

## 2024-03-07 ENCOUNTER — HOSPITAL ENCOUNTER (OUTPATIENT)
Dept: RADIOLOGY | Facility: MEDICAL CENTER | Age: 74
End: 2024-03-07
Payer: MEDICARE

## 2024-03-07 DIAGNOSIS — M25.511 ACUTE PAIN OF RIGHT SHOULDER: ICD-10-CM

## 2024-03-07 PROCEDURE — 73221 MRI JOINT UPR EXTREM W/O DYE: CPT | Mod: RT

## 2024-03-11 PROBLEM — M66.321 NONTRAUMATIC RUPTURE OF RIGHT PROXIMAL BICEPS TENDON: Status: ACTIVE | Noted: 2024-03-11

## 2024-03-11 PROBLEM — S43.431A NONTRAUMATIC TYPE 1 SUPERIOR LABRAL ANTERIOR-TO-POSTERIOR (SLAP) TEAR OF RIGHT SHOULDER: Status: ACTIVE | Noted: 2024-03-11

## 2024-03-11 PROBLEM — M19.019 ACROMIOCLAVICULAR ARTHROSIS: Status: ACTIVE | Noted: 2024-03-11

## 2024-03-11 PROBLEM — M75.100 ROTATOR CUFF TEAR: Status: ACTIVE | Noted: 2024-03-11

## 2024-03-11 PROBLEM — M75.50 SUBACROMIAL BURSITIS: Status: ACTIVE | Noted: 2024-03-11

## 2024-04-10 ENCOUNTER — APPOINTMENT (OUTPATIENT)
Dept: MEDICAL GROUP | Facility: PHYSICIAN GROUP | Age: 74
End: 2024-04-10
Payer: MEDICARE

## 2024-04-25 PROBLEM — E11.9 DIABETES MELLITUS, TYPE 2 (HCC): Status: ACTIVE | Noted: 2024-04-25

## 2024-04-25 PROBLEM — I10 HTN (HYPERTENSION): Status: ACTIVE | Noted: 2024-04-25

## 2024-06-12 ENCOUNTER — APPOINTMENT (OUTPATIENT)
Dept: MEDICAL GROUP | Facility: PHYSICIAN GROUP | Age: 74
End: 2024-06-12
Payer: MEDICARE

## 2024-08-07 ENCOUNTER — HOSPITAL ENCOUNTER (OUTPATIENT)
Facility: MEDICAL CENTER | Age: 74
End: 2024-08-07
Payer: MEDICARE

## 2024-08-07 ENCOUNTER — APPOINTMENT (OUTPATIENT)
Dept: MEDICAL GROUP | Facility: PHYSICIAN GROUP | Age: 74
End: 2024-08-07
Payer: MEDICARE

## 2024-08-07 VITALS
BODY MASS INDEX: 29.39 KG/M2 | WEIGHT: 217 LBS | TEMPERATURE: 100.4 F | DIASTOLIC BLOOD PRESSURE: 80 MMHG | HEART RATE: 72 BPM | SYSTOLIC BLOOD PRESSURE: 130 MMHG | OXYGEN SATURATION: 96 % | HEIGHT: 72 IN

## 2024-08-07 DIAGNOSIS — E11.9 TYPE 2 DIABETES MELLITUS WITHOUT COMPLICATION, WITHOUT LONG-TERM CURRENT USE OF INSULIN (HCC): ICD-10-CM

## 2024-08-07 DIAGNOSIS — E11.69 DYSLIPIDEMIA ASSOCIATED WITH TYPE 2 DIABETES MELLITUS (HCC): ICD-10-CM

## 2024-08-07 DIAGNOSIS — E78.5 DYSLIPIDEMIA ASSOCIATED WITH TYPE 2 DIABETES MELLITUS (HCC): ICD-10-CM

## 2024-08-07 DIAGNOSIS — Z79.4 TYPE 2 DIABETES MELLITUS WITH HYPERGLYCEMIA, WITH LONG-TERM CURRENT USE OF INSULIN (HCC): ICD-10-CM

## 2024-08-07 DIAGNOSIS — E11.65 TYPE 2 DIABETES MELLITUS WITH HYPERGLYCEMIA, WITH LONG-TERM CURRENT USE OF INSULIN (HCC): ICD-10-CM

## 2024-08-07 LAB
HBA1C MFR BLD: 8.6 % (ref ?–5.8)
POCT INT CON NEG: NEGATIVE
POCT INT CON POS: POSITIVE

## 2024-08-07 PROCEDURE — 3079F DIAST BP 80-89 MM HG: CPT

## 2024-08-07 PROCEDURE — 3075F SYST BP GE 130 - 139MM HG: CPT

## 2024-08-07 PROCEDURE — 83036 HEMOGLOBIN GLYCOSYLATED A1C: CPT

## 2024-08-07 PROCEDURE — 99214 OFFICE O/P EST MOD 30 MIN: CPT

## 2024-08-07 PROCEDURE — 82570 ASSAY OF URINE CREATININE: CPT

## 2024-08-07 PROCEDURE — 92250 FUNDUS PHOTOGRAPHY W/I&R: CPT | Mod: TC

## 2024-08-07 PROCEDURE — 82043 UR ALBUMIN QUANTITATIVE: CPT

## 2024-08-07 RX ORDER — METFORMIN HCL 500 MG
500 TABLET, EXTENDED RELEASE 24 HR ORAL 2 TIMES DAILY
Qty: 180 TABLET | Refills: 3 | Status: SHIPPED | OUTPATIENT
Start: 2024-08-07

## 2024-08-07 SDOH — ECONOMIC STABILITY: FOOD INSECURITY: WITHIN THE PAST 12 MONTHS, THE FOOD YOU BOUGHT JUST DIDN'T LAST AND YOU DIDN'T HAVE MONEY TO GET MORE.: NEVER TRUE

## 2024-08-07 SDOH — ECONOMIC STABILITY: INCOME INSECURITY: IN THE LAST 12 MONTHS, WAS THERE A TIME WHEN YOU WERE NOT ABLE TO PAY THE MORTGAGE OR RENT ON TIME?: NO

## 2024-08-07 SDOH — HEALTH STABILITY: PHYSICAL HEALTH: ON AVERAGE, HOW MANY DAYS PER WEEK DO YOU ENGAGE IN MODERATE TO STRENUOUS EXERCISE (LIKE A BRISK WALK)?: 3 DAYS

## 2024-08-07 SDOH — ECONOMIC STABILITY: FOOD INSECURITY: WITHIN THE PAST 12 MONTHS, YOU WORRIED THAT YOUR FOOD WOULD RUN OUT BEFORE YOU GOT MONEY TO BUY MORE.: NEVER TRUE

## 2024-08-07 SDOH — ECONOMIC STABILITY: HOUSING INSECURITY: IN THE LAST 12 MONTHS, HOW MANY PLACES HAVE YOU LIVED?: 1

## 2024-08-07 SDOH — HEALTH STABILITY: PHYSICAL HEALTH: ON AVERAGE, HOW MANY MINUTES DO YOU ENGAGE IN EXERCISE AT THIS LEVEL?: 60 MIN

## 2024-08-07 SDOH — ECONOMIC STABILITY: INCOME INSECURITY: HOW HARD IS IT FOR YOU TO PAY FOR THE VERY BASICS LIKE FOOD, HOUSING, MEDICAL CARE, AND HEATING?: NOT HARD AT ALL

## 2024-08-07 ASSESSMENT — LIFESTYLE VARIABLES
HOW MANY STANDARD DRINKS CONTAINING ALCOHOL DO YOU HAVE ON A TYPICAL DAY: 1 OR 2
SKIP TO QUESTIONS 9-10: 1
HOW OFTEN DO YOU HAVE A DRINK CONTAINING ALCOHOL: 4 OR MORE TIMES A WEEK
HOW OFTEN DO YOU HAVE SIX OR MORE DRINKS ON ONE OCCASION: NEVER
AUDIT-C TOTAL SCORE: 4

## 2024-08-07 ASSESSMENT — ENCOUNTER SYMPTOMS
DIZZINESS: 0
CHILLS: 0
FEVER: 0
SHORTNESS OF BREATH: 0
DIARRHEA: 0
HEADACHES: 0
WEAKNESS: 0
CONSTIPATION: 0
COUGH: 0
BLURRED VISION: 0
VOMITING: 0
MYALGIAS: 0
ABDOMINAL PAIN: 0
WEIGHT LOSS: 0
NAUSEA: 0

## 2024-08-07 ASSESSMENT — SOCIAL DETERMINANTS OF HEALTH (SDOH)
HOW OFTEN DO YOU HAVE SIX OR MORE DRINKS ON ONE OCCASION: NEVER
DO YOU BELONG TO ANY CLUBS OR ORGANIZATIONS SUCH AS CHURCH GROUPS UNIONS, FRATERNAL OR ATHLETIC GROUPS, OR SCHOOL GROUPS?: PATIENT DECLINED
HOW MANY DRINKS CONTAINING ALCOHOL DO YOU HAVE ON A TYPICAL DAY WHEN YOU ARE DRINKING: 1 OR 2
IN THE PAST 12 MONTHS, HAS THE ELECTRIC, GAS, OIL, OR WATER COMPANY THREATENED TO SHUT OFF SERVICE IN YOUR HOME?: NO
HOW OFTEN DO YOU ATTEND CHURCH OR RELIGIOUS SERVICES?: NEVER
HOW OFTEN DO YOU GET TOGETHER WITH FRIENDS OR RELATIVES?: PATIENT DECLINED
HOW OFTEN DO YOU ATTENT MEETINGS OF THE CLUB OR ORGANIZATION YOU BELONG TO?: PATIENT DECLINED
HOW OFTEN DO YOU ATTEND CHURCH OR RELIGIOUS SERVICES?: NEVER
HOW OFTEN DO YOU ATTENT MEETINGS OF THE CLUB OR ORGANIZATION YOU BELONG TO?: PATIENT DECLINED
IN A TYPICAL WEEK, HOW MANY TIMES DO YOU TALK ON THE PHONE WITH FAMILY, FRIENDS, OR NEIGHBORS?: PATIENT DECLINED
HOW HARD IS IT FOR YOU TO PAY FOR THE VERY BASICS LIKE FOOD, HOUSING, MEDICAL CARE, AND HEATING?: NOT HARD AT ALL
HOW OFTEN DO YOU GET TOGETHER WITH FRIENDS OR RELATIVES?: PATIENT DECLINED
DO YOU BELONG TO ANY CLUBS OR ORGANIZATIONS SUCH AS CHURCH GROUPS UNIONS, FRATERNAL OR ATHLETIC GROUPS, OR SCHOOL GROUPS?: PATIENT DECLINED
HOW OFTEN DO YOU HAVE A DRINK CONTAINING ALCOHOL: 4 OR MORE TIMES A WEEK
IN A TYPICAL WEEK, HOW MANY TIMES DO YOU TALK ON THE PHONE WITH FAMILY, FRIENDS, OR NEIGHBORS?: PATIENT DECLINED
WITHIN THE PAST 12 MONTHS, YOU WORRIED THAT YOUR FOOD WOULD RUN OUT BEFORE YOU GOT THE MONEY TO BUY MORE: NEVER TRUE

## 2024-08-07 NOTE — PROGRESS NOTES
Verbal consent was acquired by the patient to use [employer] ambient listening note generation during this visit  Subjective:     CC: Follow-up visit, diabetes    HPI:   Leroy presents today with  History of Present Illness  The patient presents for evaluation of diabetes.    Patient reports he has never undergone an A1c test before.  However, A1c in February 2024 showed marked elevation at 8.3%.. Currently, he is not on any medication for diabetes, despite feeling well overall. His diet is generally healthy, with minimal consumption of sweets, candy, and soda. He is making efforts to improve his physical fitness following shoulder surgery 14 weeks ago. Despite his efforts, he has been unable to engage in regular walking due to heat. He has plans to resume mountain biking. His shoulder surgery took place on 04/25/2024 after a fall, performed by Dr. Pavan Foley. He also had a similar surgery on his other shoulder in 2020 due to a work-related injury. He denies experiencing any numbness or tingling in his feet.    FAMILY HISTORY  He has a family history of diabetes in his sister and mother.      Problem   Dyslipidemia Associated With Type 2 Diabetes Mellitus (Hcc)         ROS:  Review of Systems   Constitutional:  Negative for chills, fever, malaise/fatigue and weight loss.   Eyes:  Negative for blurred vision.   Respiratory:  Negative for cough and shortness of breath.    Cardiovascular:  Negative for chest pain.   Gastrointestinal:  Negative for abdominal pain, constipation, diarrhea, nausea and vomiting.   Musculoskeletal:  Negative for myalgias.   Neurological:  Negative for dizziness, weakness and headaches.       Objective:     Exam:  /80 (BP Location: Left arm, Patient Position: Sitting, BP Cuff Size: Adult)   Pulse 72   Temp 38 °C (100.4 °F) (Temporal)   Ht 1.829 m (6')   Wt 98.4 kg (217 lb)   SpO2 96%   BMI 29.43 kg/m²  Body mass index is 29.43 kg/m².    Physical Exam  Constitutional:        "Appearance: Normal appearance.   HENT:      Head: Normocephalic.   Eyes:      Conjunctiva/sclera: Conjunctivae normal.      Pupils: Pupils are equal, round, and reactive to light.   Pulmonary:      Effort: Pulmonary effort is normal.   Musculoskeletal:         General: Normal range of motion.   Skin:     General: Skin is warm and dry.   Neurological:      General: No focal deficit present.      Mental Status: He is alert and oriented to person, place, and time.   Psychiatric:         Mood and Affect: Mood normal.         Behavior: Behavior normal.     Monofilament testing with a 10 gram force: sensation intact: intact bilaterally  Visual Inspection: Feet without maceration, ulcers, fissures.  Pedal pulses: intact bilaterally      Labs:   Lab Results   Component Value Date/Time    CHOLSTRLTOT 184 02/16/2024 06:51 AM     (H) 02/16/2024 06:51 AM    HDL 38 (A) 02/16/2024 06:51 AM    TRIGLYCERIDE 113 02/16/2024 06:51 AM       Lab Results   Component Value Date/Time    SODIUM 141 02/16/2024 06:51 AM    POTASSIUM 4.6 02/16/2024 06:51 AM    CHLORIDE 104 02/16/2024 06:51 AM    CO2 24 02/16/2024 06:51 AM    GLUCOSE 207 (H) 02/16/2024 06:51 AM    BUN 14 02/16/2024 06:51 AM    CREATININE 0.86 02/16/2024 06:51 AM     Lab Results   Component Value Date/Time    ALKPHOSPHAT 96 02/16/2024 06:51 AM    ASTSGOT 32 02/16/2024 06:51 AM    ALTSGPT 46 02/16/2024 06:51 AM    TBILIRUBIN 0.5 02/16/2024 06:51 AM      Lab Results   Component Value Date/Time    HBA1C 8.6 (A) 08/07/2024 02:08 PM    HBA1C 8.3 (H) 02/16/2024 06:51 AM    HBA1C 6.3 (H) 05/09/2017 11:00 AM     No results found for: \"TSH\"  No results found for: \"FREET4\"  No results found for: \"CBC\"    Assessment & Plan: Medical Decision Making     73 y.o. male with the following -   Assessment & Plan  1. Diabetes.  His A1c level has slightly increased from 8.3 in 02/2024 to 8.6 currently, indicating uncontrolled diabetes. A prescription for metformin 500 mg extended-release " twice daily with 3 refills has been provided. The potential side effects, risks, and benefits have been thoroughly discussed. A referral to a diabetic educator has been offered for further guidance on dietary habits, medications, and dietary habits-however, today he declines. Annual urine and monofilament screening will be conducted. If metformin is well-tolerated and his A1c has improved, an increase in the dosage may not be necessary. However, if his A1c remains in the >7 to 8 range, the dosage will be increased. He has been advised to report any foot wounds, non-healing wounds, pustular drainage, or significant changes. Dietary modifications, including limiting carbohydrate intake to no more than 35 g carbs per day, were recommended. A metabolic panel, lipid panel, and A1c tests have been ordered to be completed prior to his next appointment in 11/2024.    2. Dyslipidemia.  The 10-year ASCVD risk score (Neva PAGE, et al., 2019) is: 39%  He declines statin at this time.  ARB Consideration will be given to initiating a statin for kidney protection in the future.    Follow-up  A follow-up appointment is scheduled for 11/11/2024 at 8:20 a.m.    Problem List Items Addressed This Visit       Dyslipidemia associated with type 2 diabetes mellitus (HCC)    Relevant Medications    metFORMIN ER (GLUCOPHAGE XR) 500 MG TABLET SR 24 HR    Other Relevant Orders    Comp Metabolic Panel    Lipid Profile       Differential diagnosis, natural history, supportive care, and indications for immediate follow-up discussed.  Shared decision making approach utilized, and patient is amendable with plan of care.  Patient understands to return to clinic or go to the emergency department if symptoms worsen. All questions and concerns addressed to the best of my knowledge.    Return in about 3 months (around 11/7/2024).    Please note that this dictation was created using voice recognition software. I have made every reasonable attempt to  correct obvious errors, but I expect that there are errors of grammar and possibly content that I did not discover before finalizing the note.

## 2024-08-08 LAB
CREAT UR-MCNC: 203.32 MG/DL
MICROALBUMIN UR-MCNC: 1.6 MG/DL
MICROALBUMIN/CREAT UR: 8 MG/G (ref 0–30)

## 2024-08-10 LAB — RETINAL SCREEN: NEGATIVE

## 2024-11-07 ENCOUNTER — HOSPITAL ENCOUNTER (OUTPATIENT)
Dept: LAB | Facility: MEDICAL CENTER | Age: 74
End: 2024-11-07
Payer: MEDICARE

## 2024-11-07 DIAGNOSIS — Z79.4 TYPE 2 DIABETES MELLITUS WITH HYPERGLYCEMIA, WITH LONG-TERM CURRENT USE OF INSULIN (HCC): ICD-10-CM

## 2024-11-07 DIAGNOSIS — E11.65 TYPE 2 DIABETES MELLITUS WITH HYPERGLYCEMIA, WITH LONG-TERM CURRENT USE OF INSULIN (HCC): ICD-10-CM

## 2024-11-07 DIAGNOSIS — E11.69 DYSLIPIDEMIA ASSOCIATED WITH TYPE 2 DIABETES MELLITUS (HCC): ICD-10-CM

## 2024-11-07 DIAGNOSIS — E78.5 DYSLIPIDEMIA ASSOCIATED WITH TYPE 2 DIABETES MELLITUS (HCC): ICD-10-CM

## 2024-11-07 LAB
ALBUMIN SERPL BCP-MCNC: 4.3 G/DL (ref 3.2–4.9)
ALBUMIN/GLOB SERPL: 1.4 G/DL
ALP SERPL-CCNC: 84 U/L (ref 30–99)
ALT SERPL-CCNC: 36 U/L (ref 2–50)
ANION GAP SERPL CALC-SCNC: 10 MMOL/L (ref 7–16)
AST SERPL-CCNC: 30 U/L (ref 12–45)
BILIRUB SERPL-MCNC: 0.6 MG/DL (ref 0.1–1.5)
BUN SERPL-MCNC: 13 MG/DL (ref 8–22)
CALCIUM ALBUM COR SERPL-MCNC: 9.4 MG/DL (ref 8.5–10.5)
CALCIUM SERPL-MCNC: 9.6 MG/DL (ref 8.5–10.5)
CHLORIDE SERPL-SCNC: 104 MMOL/L (ref 96–112)
CHOLEST SERPL-MCNC: 211 MG/DL (ref 100–199)
CO2 SERPL-SCNC: 25 MMOL/L (ref 20–33)
CREAT SERPL-MCNC: 0.97 MG/DL (ref 0.5–1.4)
EST. AVERAGE GLUCOSE BLD GHB EST-MCNC: 171 MG/DL
FASTING STATUS PATIENT QL REPORTED: NORMAL
GFR SERPLBLD CREATININE-BSD FMLA CKD-EPI: 82 ML/MIN/1.73 M 2
GLOBULIN SER CALC-MCNC: 3 G/DL (ref 1.9–3.5)
GLUCOSE SERPL-MCNC: 154 MG/DL (ref 65–99)
HBA1C MFR BLD: 7.6 % (ref 4–5.6)
HDLC SERPL-MCNC: 44 MG/DL
LDLC SERPL CALC-MCNC: 146 MG/DL
POTASSIUM SERPL-SCNC: 5.2 MMOL/L (ref 3.6–5.5)
PROT SERPL-MCNC: 7.3 G/DL (ref 6–8.2)
SODIUM SERPL-SCNC: 139 MMOL/L (ref 135–145)
TRIGL SERPL-MCNC: 105 MG/DL (ref 0–149)

## 2024-11-07 PROCEDURE — 36415 COLL VENOUS BLD VENIPUNCTURE: CPT

## 2024-11-07 PROCEDURE — 83036 HEMOGLOBIN GLYCOSYLATED A1C: CPT

## 2024-11-07 PROCEDURE — 80053 COMPREHEN METABOLIC PANEL: CPT

## 2024-11-07 PROCEDURE — 80061 LIPID PANEL: CPT

## 2024-11-11 ENCOUNTER — OFFICE VISIT (OUTPATIENT)
Dept: MEDICAL GROUP | Facility: PHYSICIAN GROUP | Age: 74
End: 2024-11-11
Payer: MEDICARE

## 2024-11-11 VITALS
HEIGHT: 72 IN | HEART RATE: 63 BPM | WEIGHT: 207.8 LBS | SYSTOLIC BLOOD PRESSURE: 120 MMHG | BODY MASS INDEX: 28.15 KG/M2 | TEMPERATURE: 97.1 F | DIASTOLIC BLOOD PRESSURE: 70 MMHG

## 2024-11-11 DIAGNOSIS — E11.65 TYPE 2 DIABETES MELLITUS WITH HYPERGLYCEMIA, WITH LONG-TERM CURRENT USE OF INSULIN (HCC): ICD-10-CM

## 2024-11-11 DIAGNOSIS — Z79.4 TYPE 2 DIABETES MELLITUS WITH HYPERGLYCEMIA, WITH LONG-TERM CURRENT USE OF INSULIN (HCC): ICD-10-CM

## 2024-11-11 PROCEDURE — 3074F SYST BP LT 130 MM HG: CPT

## 2024-11-11 PROCEDURE — 3078F DIAST BP <80 MM HG: CPT

## 2024-11-11 PROCEDURE — 99214 OFFICE O/P EST MOD 30 MIN: CPT

## 2024-11-11 RX ORDER — TIRZEPATIDE 2.5 MG/.5ML
2.5 INJECTION, SOLUTION SUBCUTANEOUS
Qty: 2 ML | Refills: 1 | Status: SHIPPED | OUTPATIENT
Start: 2024-11-11

## 2024-11-11 ASSESSMENT — ENCOUNTER SYMPTOMS
COUGH: 0
MYALGIAS: 0
ABDOMINAL PAIN: 0
WEAKNESS: 0
WEIGHT LOSS: 0
DIARRHEA: 0
SHORTNESS OF BREATH: 0
CHILLS: 0
FEVER: 0
BLURRED VISION: 0
CONSTIPATION: 0
HEADACHES: 0
VOMITING: 0
NAUSEA: 0
DIZZINESS: 0

## 2024-11-11 NOTE — PROGRESS NOTES
Verbal consent was acquired by the patient to use [employer] ambient listening note generation during this visit  Subjective:     CC: diabetes    HPI:   Leroy presents today with  History of Present Illness  The patient presents for evaluation of diabetes.    He is considering switching his metformin medication to an injectable form due to difficulties in remembering to take the pills. He has been making efforts to increase his physical activity, including joining a gym and walking his dog daily.      No problems updated.      ROS:  Review of Systems   Constitutional:  Negative for chills, fever, malaise/fatigue and weight loss.   Eyes:  Negative for blurred vision.   Respiratory:  Negative for cough and shortness of breath.    Cardiovascular:  Negative for chest pain.   Gastrointestinal:  Negative for abdominal pain, constipation, diarrhea, nausea and vomiting.   Musculoskeletal:  Negative for myalgias.   Neurological:  Negative for dizziness, weakness and headaches.       Objective:     Exam:  /70 (BP Location: Left arm, Patient Position: Sitting, BP Cuff Size: Adult)   Pulse 63   Temp 36.2 °C (97.1 °F) (Temporal)   Ht 1.829 m (6')   Wt 94.3 kg (207 lb 12.8 oz)   BMI 28.18 kg/m²  Body mass index is 28.18 kg/m².    Physical Exam  Constitutional:       Appearance: Normal appearance.   HENT:      Head: Normocephalic.   Eyes:      Conjunctiva/sclera: Conjunctivae normal.      Pupils: Pupils are equal, round, and reactive to light.   Cardiovascular:      Rate and Rhythm: Normal rate and regular rhythm.      Heart sounds: No murmur heard.  Pulmonary:      Effort: Pulmonary effort is normal. No respiratory distress.      Breath sounds: Normal breath sounds.   Musculoskeletal:         General: Normal range of motion.   Skin:     General: Skin is warm and dry.   Neurological:      General: No focal deficit present.      Mental Status: He is alert and oriented to person, place, and time.   Psychiatric:          Mood and Affect: Mood normal.         Behavior: Behavior normal.         Labs:   Hospital Outpatient Visit on 11/07/2024   Component Date Value    Glycohemoglobin 11/07/2024 7.6 (H)     Est Avg Glucose 11/07/2024 171     Cholesterol,Tot 11/07/2024 211 (H)     Triglycerides 11/07/2024 105     HDL 11/07/2024 44     LDL 11/07/2024 146 (H)     Sodium 11/07/2024 139     Potassium 11/07/2024 5.2     Chloride 11/07/2024 104     Co2 11/07/2024 25     Anion Gap 11/07/2024 10.0     Glucose 11/07/2024 154 (H)     Bun 11/07/2024 13     Creatinine 11/07/2024 0.97     Calcium 11/07/2024 9.6     Correct Calcium 11/07/2024 9.4     AST(SGOT) 11/07/2024 30     ALT(SGPT) 11/07/2024 36     Alkaline Phosphatase 11/07/2024 84     Total Bilirubin 11/07/2024 0.6     Albumin 11/07/2024 4.3     Total Protein 11/07/2024 7.3     Globulin 11/07/2024 3.0     A-G Ratio 11/07/2024 1.4     Fasting Status 11/07/2024 Fasting     GFR (CKD-EPI) 11/07/2024 82        Assessment & Plan: Medical Decision Making     74 y.o. male with the following -   Assessment & Plan  1. Diabetes Mellitus.  Chronic uncontrolled  Although his glucose level was slightly elevated, there has been an improvement in his diabetes control, with his A1c dropping from 8.6 to 7.6 since August. His cholesterol levels are within normal range, and he is not currently on any statin medication. His microalbumin levels were normal in August, with a minor presence of protein in his urine. Mounjaro 2.5 mg weekly injection has been prescribed. He has been advised to continue his metformin regimen 500 mg BID. His liver, kidney, electrolyte, and A1c levels will be monitored. If his A1c levels remain stable, the metformin will be discontinued. Side effects of Mounjaro 2.5 mg weekly, including potential gut motility issues, have been discussed. If insurance does not cover Mounjaro, Ozempic will be considered as an alternative.    Follow-up  Patient is scheduled for a follow-up visit on  02/11/2025 at 8:00 AM.    Problem List Items Addressed This Visit    None  Visit Diagnoses       Type 2 diabetes mellitus with hyperglycemia, with long-term current use of insulin (HCC)        Relevant Medications    Tirzepatide (MOUNJARO) 2.5 MG/0.5ML Solution Auto-injector    Other Relevant Orders    HEMOGLOBIN A1C    Comp Metabolic Panel            Differential diagnosis, natural history, supportive care, and indications for immediate follow-up discussed.  Shared decision making approach utilized, and patient is amendable with plan of care.  Patient understands to return to clinic or go to the emergency department if symptoms worsen. All questions and concerns addressed to the best of my knowledge.    Return in about 3 months (around 2/11/2025), or if symptoms worsen or fail to improve.    Please note that this dictation was created using voice recognition software. I have made every reasonable attempt to correct obvious errors, but I expect that there are errors of grammar and possibly content that I did not discover before finalizing the note.

## 2024-12-28 DIAGNOSIS — E11.65 TYPE 2 DIABETES MELLITUS WITH HYPERGLYCEMIA, WITH LONG-TERM CURRENT USE OF INSULIN (HCC): ICD-10-CM

## 2024-12-28 DIAGNOSIS — Z79.4 TYPE 2 DIABETES MELLITUS WITH HYPERGLYCEMIA, WITH LONG-TERM CURRENT USE OF INSULIN (HCC): ICD-10-CM

## 2024-12-30 RX ORDER — TIRZEPATIDE 2.5 MG/.5ML
2.5 INJECTION, SOLUTION SUBCUTANEOUS
Qty: 2 ML | Refills: 1 | Status: SHIPPED | OUTPATIENT
Start: 2024-12-30

## 2024-12-30 NOTE — TELEPHONE ENCOUNTER
Received request via: Patient    Was the patient seen in the last year in this department? Yes    Does the patient have an active prescription (recently filled or refills available) for medication(s) requested? No    Pharmacy Name:     Does the patient have custodial Plus and need 100-day supply? (This applies to ALL medications)

## 2025-02-06 ENCOUNTER — HOSPITAL ENCOUNTER (OUTPATIENT)
Dept: LAB | Facility: MEDICAL CENTER | Age: 75
End: 2025-02-06
Payer: MEDICARE

## 2025-02-06 DIAGNOSIS — Z79.4 TYPE 2 DIABETES MELLITUS WITH HYPERGLYCEMIA, WITH LONG-TERM CURRENT USE OF INSULIN (HCC): ICD-10-CM

## 2025-02-06 DIAGNOSIS — E11.65 TYPE 2 DIABETES MELLITUS WITH HYPERGLYCEMIA, WITH LONG-TERM CURRENT USE OF INSULIN (HCC): ICD-10-CM

## 2025-02-06 LAB
ALBUMIN SERPL BCP-MCNC: 4.1 G/DL (ref 3.2–4.9)
ALBUMIN/GLOB SERPL: 1.4 G/DL
ALP SERPL-CCNC: 74 U/L (ref 30–99)
ALT SERPL-CCNC: 26 U/L (ref 2–50)
ANION GAP SERPL CALC-SCNC: 9 MMOL/L (ref 7–16)
AST SERPL-CCNC: 25 U/L (ref 12–45)
BILIRUB SERPL-MCNC: 0.7 MG/DL (ref 0.1–1.5)
BUN SERPL-MCNC: 17 MG/DL (ref 8–22)
CALCIUM ALBUM COR SERPL-MCNC: 9.2 MG/DL (ref 8.5–10.5)
CALCIUM SERPL-MCNC: 9.3 MG/DL (ref 8.5–10.5)
CHLORIDE SERPL-SCNC: 104 MMOL/L (ref 96–112)
CO2 SERPL-SCNC: 25 MMOL/L (ref 20–33)
CREAT SERPL-MCNC: 1.15 MG/DL (ref 0.5–1.4)
EST. AVERAGE GLUCOSE BLD GHB EST-MCNC: 148 MG/DL
GFR SERPLBLD CREATININE-BSD FMLA CKD-EPI: 67 ML/MIN/1.73 M 2
GLOBULIN SER CALC-MCNC: 3 G/DL (ref 1.9–3.5)
GLUCOSE SERPL-MCNC: 111 MG/DL (ref 65–99)
HBA1C MFR BLD: 6.8 % (ref 4–5.6)
POTASSIUM SERPL-SCNC: 4.8 MMOL/L (ref 3.6–5.5)
PROT SERPL-MCNC: 7.1 G/DL (ref 6–8.2)
SODIUM SERPL-SCNC: 138 MMOL/L (ref 135–145)

## 2025-02-06 PROCEDURE — 36415 COLL VENOUS BLD VENIPUNCTURE: CPT

## 2025-02-06 PROCEDURE — 83036 HEMOGLOBIN GLYCOSYLATED A1C: CPT

## 2025-02-06 PROCEDURE — 80053 COMPREHEN METABOLIC PANEL: CPT

## 2025-02-11 ENCOUNTER — OFFICE VISIT (OUTPATIENT)
Dept: MEDICAL GROUP | Facility: PHYSICIAN GROUP | Age: 75
End: 2025-02-11
Payer: MEDICARE

## 2025-02-11 VITALS
BODY MASS INDEX: 26.41 KG/M2 | OXYGEN SATURATION: 98 % | HEART RATE: 62 BPM | HEIGHT: 72 IN | SYSTOLIC BLOOD PRESSURE: 110 MMHG | WEIGHT: 195 LBS | DIASTOLIC BLOOD PRESSURE: 60 MMHG | TEMPERATURE: 96 F

## 2025-02-11 DIAGNOSIS — E11.65 TYPE 2 DIABETES MELLITUS WITH HYPERGLYCEMIA, WITH LONG-TERM CURRENT USE OF INSULIN (HCC): ICD-10-CM

## 2025-02-11 DIAGNOSIS — E11.69 DYSLIPIDEMIA ASSOCIATED WITH TYPE 2 DIABETES MELLITUS (HCC): ICD-10-CM

## 2025-02-11 DIAGNOSIS — E11.59 HYPERTENSION ASSOCIATED WITH TYPE 2 DIABETES MELLITUS (HCC): ICD-10-CM

## 2025-02-11 DIAGNOSIS — E78.5 DYSLIPIDEMIA ASSOCIATED WITH TYPE 2 DIABETES MELLITUS (HCC): ICD-10-CM

## 2025-02-11 DIAGNOSIS — I15.2 HYPERTENSION ASSOCIATED WITH TYPE 2 DIABETES MELLITUS (HCC): ICD-10-CM

## 2025-02-11 DIAGNOSIS — Z79.4 TYPE 2 DIABETES MELLITUS WITH HYPERGLYCEMIA, WITH LONG-TERM CURRENT USE OF INSULIN (HCC): ICD-10-CM

## 2025-02-11 PROCEDURE — 99214 OFFICE O/P EST MOD 30 MIN: CPT

## 2025-02-11 PROCEDURE — 3074F SYST BP LT 130 MM HG: CPT

## 2025-02-11 PROCEDURE — 3078F DIAST BP <80 MM HG: CPT

## 2025-02-11 RX ORDER — TIRZEPATIDE 5 MG/.5ML
5 INJECTION, SOLUTION SUBCUTANEOUS
Qty: 6 ML | Refills: 2 | Status: SHIPPED | OUTPATIENT
Start: 2025-02-11 | End: 2025-02-11

## 2025-02-11 RX ORDER — TIRZEPATIDE 5 MG/.5ML
5 INJECTION, SOLUTION SUBCUTANEOUS
Qty: 6 ML | Refills: 2 | Status: SHIPPED | OUTPATIENT
Start: 2025-02-11

## 2025-02-11 ASSESSMENT — ENCOUNTER SYMPTOMS
COUGH: 0
SHORTNESS OF BREATH: 0
BLURRED VISION: 0
WEIGHT LOSS: 0
HEADACHES: 0
DIARRHEA: 0
DIZZINESS: 0
NAUSEA: 0
ABDOMINAL PAIN: 0
CONSTIPATION: 0
MYALGIAS: 0
VOMITING: 0
WEAKNESS: 0
FEVER: 0
CHILLS: 0

## 2025-02-11 ASSESSMENT — PATIENT HEALTH QUESTIONNAIRE - PHQ9: CLINICAL INTERPRETATION OF PHQ2 SCORE: 0

## 2025-02-11 NOTE — PROGRESS NOTES
Verbal consent was acquired by the patient to use [employer] ambient listening note generation during this visit  Subjective:     CC:  Diagnoses of Type 2 diabetes mellitus with hyperglycemia, with long-term current use of insulin (HCC), Hypertension associated with type 2 diabetes mellitus (HCC), and Dyslipidemia associated with type 2 diabetes mellitus (HCC) were pertinent to this visit.    HISTORY OF THE PRESENT ILLNESS: Patient is a 74 y.o. male.   No problems updated.    History of Present Illness  The patient presents for evaluation of diabetes.    He has been making dietary modifications, including a reduction in soda consumption, with occasional indulgences. He reports no adverse effects from the medication. He expresses a willingness to increase the dosage of Mounjaro and requests a 3-month supply. He mentions a single instance of incorrect administration of the medication. He monitors his blood glucose levels intermittently. He recently underwent an ophthalmological examination conducted by Dr. Wilson at [place], with all findings reported as normal. He is on Mounjaro.    SOCIAL HISTORY  He is employed as a  for [employer].    MEDICATIONS  Current: Mounjaro    ROS:   Review of Systems   Constitutional:  Negative for chills, fever, malaise/fatigue and weight loss.   Eyes:  Negative for blurred vision.   Respiratory:  Negative for cough and shortness of breath.    Cardiovascular:  Negative for chest pain.   Gastrointestinal:  Negative for abdominal pain, constipation, diarrhea, nausea and vomiting.   Musculoskeletal:  Negative for myalgias.   Neurological:  Negative for dizziness, weakness and headaches.         Objective:     Exam: /60 (BP Location: Left arm, Patient Position: Sitting, BP Cuff Size: Adult)   Pulse 62   Temp (!) 35.6 °C (96 °F) (Oral)   Ht 1.829 m (6')   Wt 88.5 kg (195 lb)   SpO2 98%  Body mass index is 26.45 kg/m².    Physical Exam  Constitutional:        Appearance: Normal appearance.   HENT:      Head: Normocephalic.   Eyes:      Conjunctiva/sclera: Conjunctivae normal.      Pupils: Pupils are equal, round, and reactive to light.   Pulmonary:      Effort: Pulmonary effort is normal.   Musculoskeletal:         General: Normal range of motion.   Skin:     General: Skin is warm and dry.   Neurological:      General: No focal deficit present.      Mental Status: He is alert and oriented to person, place, and time.   Psychiatric:         Mood and Affect: Mood normal.         Behavior: Behavior normal.           Labs:   Hospital Outpatient Visit on 02/06/2025   Component Date Value    Sodium 02/06/2025 138     Potassium 02/06/2025 4.8     Chloride 02/06/2025 104     Co2 02/06/2025 25     Anion Gap 02/06/2025 9.0     Glucose 02/06/2025 111 (H)     Bun 02/06/2025 17     Creatinine 02/06/2025 1.15     Calcium 02/06/2025 9.3     Correct Calcium 02/06/2025 9.2     AST(SGOT) 02/06/2025 25     ALT(SGPT) 02/06/2025 26     Alkaline Phosphatase 02/06/2025 74     Total Bilirubin 02/06/2025 0.7     Albumin 02/06/2025 4.1     Total Protein 02/06/2025 7.1     Globulin 02/06/2025 3.0     A-G Ratio 02/06/2025 1.4     Glycohemoglobin 02/06/2025 6.8 (H)     Est Avg Glucose 02/06/2025 148     GFR (CKD-EPI) 02/06/2025 67          Assessment & Plan: Medical Decision Making   74 y.o. male with the following -    Assessment & Plan  1. Diabetes mellitus.  His fasting blood glucose levels have decreased to the 111 range, and his A1c has significantly reduced from 8.6 to 6.8, indicating a reversal towards the prediabetic range. He has also achieved a weight loss of 12 pounds. He is advised to maintain a balanced diet, prioritize protein intake, engage in strength training, and continue skiing to stay active. The dosage of Mounjaro will be increased to 5 mg, with a prescription for a 3-month supply and additional refills provided. The prescription will be sent to [facility], but it can be switched to  [facility] if preferred. A comprehensive lab workup, including cholesterol, A1c, metabolic panel, and creatinine, will be conducted during the next visit. He will continue Metformin 500 mg BID for now.    2. Overweight BMI 26.45%  Weight improving, down from 207 lbs in November, now 195 lbs  -Exercise: At least 150 minutes of moderate aerobic activity per week or 75 minutes of vigorous aerobic activity per week, +2 days/week of strength training  - Healthy lifestyle and eating habits: Mediterranean-based diet (rich in fruits, vegetables, nuts and healthy oils), proper hydration and avoiding sugary beverages, adequate sleep hygiene-(allowing 7 to 8 hours of overnight sleep).    3. Hypertension  Chronic, stable with BP at 110/60, not currently utilizing antihypertensives. With continued lifestyle efforts he will not need medications.  Follow-up    4. Hyperlipidemia  The 10-year ASCVD risk score (Neva PAGE, et al., 2019) is: 32.6%  Not currently on a statin, he will continue efforts towards healthy lifestyle as dicussed.    The patient is scheduled for a follow-up visit on 08/11/2024 at 8:00 AM.    HCC Gap Form    Diagnosis to address: E11.65, Z79.4 - Type 2 diabetes mellitus with hyperglycemia, with long-term current use of insulin (HCC)  Assessment and plan: Chronic, improving. Continue with current defined treatment plan: see note.  Follow-up at least annually.  Last edited 02/11/25 08:19 PST by Vasquez Mckenzie D.N.P.           Problem List Items Addressed This Visit       Type 2 diabetes mellitus with hyperglycemia, with long-term current use of insulin (HCC)    Relevant Medications    Tirzepatide (MOUNJARO) 5 MG/0.5ML Solution Auto-injector    Other Relevant Orders    HEMOGLOBIN A1C    Comp Metabolic Panel    Lipid Profile    MICROALBUMIN CREAT RATIO URINE     Other Visit Diagnoses       Hypertension associated with type 2 diabetes mellitus (HCC)        Relevant Medications    Tirzepatide (MOUNJARO) 5 MG/0.5ML Solution  Auto-injector    Other Relevant Orders    Comp Metabolic Panel    MICROALBUMIN CREAT RATIO URINE    Dyslipidemia associated with type 2 diabetes mellitus (HCC)        Relevant Medications    Tirzepatide (MOUNJARO) 5 MG/0.5ML Solution Auto-injector    Other Relevant Orders    Comp Metabolic Panel    Lipid Profile            Differential diagnosis, natural history, supportive care, and indications for immediate follow-up discussed.  Shared decision making approach was utilized, and patient is amendable with plan of care.  Patient understands to return to clinic or go to the emergency department if symptoms worsen. All questions and concerns addressed to the best of my knowledge.      Return in about 6 months (around 8/11/2025), or if symptoms worsen or fail to improve.    Please note that this dictation was created using voice recognition software. I have made every reasonable attempt to correct obvious errors, but I expect that there are errors of grammar and possibly content that I did not discover before finalizing the note.

## 2025-03-24 ENCOUNTER — PATIENT MESSAGE (OUTPATIENT)
Dept: HEALTH INFORMATION MANAGEMENT | Facility: OTHER | Age: 75
End: 2025-03-24

## 2025-06-10 NOTE — ASSESSMENT & PLAN NOTE
Chronic, ongoing. Reviewed lipid profile from November 2024. No current lipid-lowering medication use. Provided education on following a heart healthy diet with adequate intake of fruits, vegetables, and whole grains. Encourage 30 minutes of moderate exercise 3-4 times a week. Provided [employer] gym resources. Denies chest pain and claudication. Routinely monitored by primary care provider.        Component  Ref Range & Units (hover) 7 mo ago   Cholesterol,Tot 211 High    Triglycerides 105   HDL 44    High

## 2025-06-10 NOTE — ASSESSMENT & PLAN NOTE
Chronic, stable. Most recent hemoglobin A1C was 6.8 in February 2025. Currently taking metformin 500 mg twice daily and tirzepatide weekly. Routinely monitored by primary care provider.

## 2025-06-12 ENCOUNTER — OFFICE VISIT (OUTPATIENT)
Dept: MEDICAL GROUP | Facility: MEDICAL CENTER | Age: 75
End: 2025-06-12
Attending: FAMILY MEDICINE
Payer: MEDICARE

## 2025-06-12 VITALS
OXYGEN SATURATION: 99 % | DIASTOLIC BLOOD PRESSURE: 66 MMHG | BODY MASS INDEX: 25.47 KG/M2 | TEMPERATURE: 97.6 F | SYSTOLIC BLOOD PRESSURE: 128 MMHG | HEART RATE: 56 BPM | WEIGHT: 188 LBS | HEIGHT: 72 IN

## 2025-06-12 DIAGNOSIS — E78.5 DYSLIPIDEMIA ASSOCIATED WITH TYPE 2 DIABETES MELLITUS (HCC): ICD-10-CM

## 2025-06-12 DIAGNOSIS — Z12.12 ENCOUNTER FOR COLORECTAL CANCER SCREENING: ICD-10-CM

## 2025-06-12 DIAGNOSIS — Z12.11 ENCOUNTER FOR COLORECTAL CANCER SCREENING: ICD-10-CM

## 2025-06-12 DIAGNOSIS — E11.65 TYPE 2 DIABETES MELLITUS WITH HYPERGLYCEMIA, WITH LONG-TERM CURRENT USE OF INSULIN (HCC): ICD-10-CM

## 2025-06-12 DIAGNOSIS — E11.69 DYSLIPIDEMIA ASSOCIATED WITH TYPE 2 DIABETES MELLITUS (HCC): ICD-10-CM

## 2025-06-12 DIAGNOSIS — Z79.4 TYPE 2 DIABETES MELLITUS WITH HYPERGLYCEMIA, WITH LONG-TERM CURRENT USE OF INSULIN (HCC): ICD-10-CM

## 2025-06-12 PROBLEM — Z91.81 RISK FOR FALLS: Status: RESOLVED | Noted: 2024-02-14 | Resolved: 2025-06-12

## 2025-06-12 PROCEDURE — 3078F DIAST BP <80 MM HG: CPT

## 2025-06-12 PROCEDURE — 1126F AMNT PAIN NOTED NONE PRSNT: CPT

## 2025-06-12 PROCEDURE — G0438 PPPS, INITIAL VISIT: HCPCS

## 2025-06-12 PROCEDURE — 3074F SYST BP LT 130 MM HG: CPT

## 2025-06-12 SDOH — ECONOMIC STABILITY: FOOD INSECURITY: WITHIN THE PAST 12 MONTHS, YOU WORRIED THAT YOUR FOOD WOULD RUN OUT BEFORE YOU GOT THE MONEY TO BUY MORE.: NEVER TRUE

## 2025-06-12 SDOH — ECONOMIC STABILITY: FOOD INSECURITY: HOW HARD IS IT FOR YOU TO PAY FOR THE VERY BASICS LIKE FOOD, HOUSING, MEDICAL CARE, AND HEATING?: NOT HARD AT ALL

## 2025-06-12 SDOH — ECONOMIC STABILITY: TRANSPORTATION INSECURITY: IN THE PAST 12 MONTHS, HAS LACK OF TRANSPORTATION KEPT YOU FROM MEDICAL APPOINTMENTS OR FROM GETTING MEDICATIONS?: NO

## 2025-06-12 SDOH — ECONOMIC STABILITY: FOOD INSECURITY: WITHIN THE PAST 12 MONTHS, THE FOOD YOU BOUGHT JUST DIDN'T LAST AND YOU DIDN'T HAVE MONEY TO GET MORE.: NEVER TRUE

## 2025-06-12 ASSESSMENT — ENCOUNTER SYMPTOMS: GENERAL WELL-BEING: GOOD

## 2025-06-12 ASSESSMENT — PATIENT HEALTH QUESTIONNAIRE - PHQ9: CLINICAL INTERPRETATION OF PHQ2 SCORE: 0

## 2025-06-12 ASSESSMENT — ACTIVITIES OF DAILY LIVING (ADL)
LACK_OF_TRANSPORTATION: NO
BATHING_REQUIRES_ASSISTANCE: 0

## 2025-06-12 ASSESSMENT — PAIN SCALES - GENERAL: PAINLEVEL_OUTOF10: NO PAIN

## 2025-06-12 NOTE — PROGRESS NOTES
Comprehensive Health Assessment Program     Leroy Padron is a 74 y.o. here for his comprehensive health assessment.    Patient Active Problem List    Diagnosis Date Noted    Encounter for colorectal cancer screening 06/12/2025    Dyslipidemia associated with type 2 diabetes mellitus ([facility]) 02/11/2025    Type 2 diabetes mellitus with hyperglycemia, with long-term current use of insulin ([facility]) 11/11/2024    Hypertension associated with type 2 diabetes mellitus ([facility]) 04/25/2024    Rotator cuff tear 03/11/2024    Subacromial bursitis 03/11/2024    Nontraumatic type 1 superior labral anterior-to-posterior (SLAP) tear of right shoulder 03/11/2024    Nontraumatic rupture of right proximal biceps tendon 03/11/2024    Acromioclavicular arthrosis 03/11/2024    Acute pain of right shoulder 02/14/2024       Current Medications[1]       Current supplements as per medication list.     Allergies:   Patient has no known allergies.  Social History[2]  Family History   Problem Relation Age of Onset    Diabetes Mother     Heart Disease Father     Diabetes Sister      Leroy  has a past medical history of Bell's palsy and H/O total knee replacement.   Past Surgical History[3]    Screening:  In the last six months have you experienced any leakage of urine? No    Depression Screening  Little interest or pleasure in doing things?  0 - not at all  Feeling down, depressed , or hopeless? 0 - not at all  Patient Health Questionnaire Score:  0    If depressive symptoms identified deferred to follow up visit unless specifically addressed in assessment and plan.    Interpretation of PHQ-9 Total Score   Score Severity   1-4 No Depression   5-9 Mild Depression   10-14 Moderate Depression   15-19 Moderately Severe Depression   20-27 Severe Depression    Screening for Cognitive Impairment  Do you or any of your friends or family members have any concern about your memory? No  Three Minute Recall (Village, Kitchen, Baby) 3/3    Ermias clock face  with all 12 numbers and set the hands to show 10 minutes past 11.  Yes    Cognitive concerns identified deferred for follow up unless specifically addressed in assessment and plan.    Fall Risk Assessment  Has the patient had two or more falls in the last year or any fall with injury in the last year?  No    Safety Assessment  Do you always wear your seatbelt?  Yes  Any changes to home needed to function safely? No  Difficulty hearing.  Yes  Patient counseled about all safety risks that were identified.    Functional Assessment ADLs  Are there any barriers preventing you from cooking for yourself or meeting nutritional needs?  No.    Are there any barriers preventing you from driving safely or obtaining transportation?  No.    Are there any barriers preventing you from using a telephone or calling for help?  No    Are there any barriers preventing you from shopping?  No.    Are there any barriers preventing you from taking care of your own finances?  No    Are there any barriers preventing you from managing your medications?  No    Are there any barriers preventing you from showering, bathing or dressing yourself? No    Are there any barriers preventing you from doing housework or laundry? No    Are there any barriers preventing you from using the toilet?No    Are you currently engaging in any exercise or physical activity?  Yes.      Self-Assessment of Health  What is your perception of your health? Good    Do you sleep more than six hours a night? Yes    In the past 7 days, how much did pain keep you from doing your normal work? None    Do you spend quality time with family or friends (virtually or in person)? Yes    Do you usually eat a heart healthy diet that constists of a variety of fruits, vegetables, whole grains and fiber? Yes    Do you eat foods high in fat and/or Fast Food more than three times per week? No    How concerned are you that your medical conditions are not being well managed? Not at all    Are  you worried that in the next 2 months, you may not have stable housing that you own, rent, or stay in as part of a household? No        Advance Care Planning  Do you have an Advance Directive, Living Will, Durable Power of , or POLST? No   Provided patient with educational brochure regarding Advance Care Planning.                  Health Maintenance Summary            Current Care Gaps       IMM DTaP/Tdap/Td Vaccine (1 - Tdap) Overdue since 12/10/2023      12/09/2023  Imm Admin: TD Vaccine              Hepatitis C Screening (Once) Never done     No completion history exists for this topic.              Pneumococcal Vaccine: 50+ Years (1 of 2 - PCV) Never done     No completion history exists for this topic.              Zoster (Shingles) Vaccines (1 of 2) Never done     No completion history exists for this topic.              COVID-19 Vaccine (1 - 2024-25 season) Never done     No completion history exists for this topic.                      Needs Review       A1c Screening (Every 6 Months) Tentatively due on 8/6/2025 02/11/2025  Order placed for HEMOGLOBIN A1C by JAKE KaurN.P.    02/06/2025  HEMOGLOBIN A1C    11/07/2024  HEMOGLOBIN A1C    08/07/2024  POCT Hemoglobin A1C    02/16/2024  HEMOGLOBIN A1C     Only the first 5 history entries have been loaded, but more history exists.                    Awaiting Completion       Colorectal Cancer Screening (View Topic Details) Order placed this encounter      06/12/2025  Order placed for Cologuard® colon cancer screening by DAVIN Bal                      Upcoming       Diabetes: Urine Protein Screening (Yearly) Next due on 8/7/2025 02/11/2025  Order placed for MICROALBUMIN CREAT RATIO URINE by LILY Kaur.P.    08/07/2024  POCT Microalbumin Creat Ratio Urine              Diabetes: Monofilament / LE Exam (Yearly) Next due on 8/7/2025 08/07/2024  [employer]: WORKFLOW - DIABETES - DIABETIC FOOT EXAM PERFORMED    08/07/2024   Diabetic Monofilament LE Exam              Influenza Vaccine (Season Ended) Next due on 9/1/2025      No completion history exists for this topic.              Fasting Lipid Profile (Yearly) Next due on 11/7/2025 02/11/2025  Order placed for Lipid Profile by JULY KaurP.    11/07/2024  Lipid Profile    02/16/2024  Lipid Profile    09/16/2017  LIPID PROFILE    09/13/2014  LIPID PROFILE     Only the first 5 history entries have been loaded, but more history exists.            SERUM CREATININE (Yearly) Next due on 2/6/2026 02/11/2025  Order placed for Comp Metabolic Panel by JULY KaurP.    02/06/2025  Comp Metabolic Panel    11/07/2024  Comp Metabolic Panel    02/16/2024  Comp Metabolic Panel    09/16/2017  COMP METABOLIC PANEL      Only the first 5 history entries have been loaded, but more history exists.              Diabetes: Retinopathy Screening (Yearly) Next due on 2/10/2026      02/10/2025  RETINAL SCREENING RESULTS    08/07/2024  POCT Retinal Eye Exam              Annual Wellness Visit (Yearly) Next due on 6/12/2026 06/12/2025  Level of Service: [place] ANNUAL WELLNESS VISIT-INCLUDES PPPS SUBSEQUE*                      Completed or No Longer Recommended       Hepatitis A Vaccine (Hep A) (Series Information) Aged Out      No completion history exists for this topic.              Hepatitis B Vaccine (Hep B) (Series Information) Aged Out     No completion history exists for this topic.              HPV Vaccines (Series Information) Aged Out     No completion history exists for this topic.              Polio Vaccine (Inactivated Polio) (Series Information) Aged Out     No completion history exists for this topic.              Meningococcal Immunization (Series Information) Aged Out     No completion history exists for this topic.              Meningococcal B Vaccine (Series Information) Aged Out     No completion history exists for this topic.                            Patient Care  Team:  Vasquez Mckenzie D.N.P. as PCP - General (Family Medicine)    Financial Resource Strain: Low Risk  (6/12/2025)    Overall Financial Resource Strain (CARDIA)     Difficulty of Paying Living Expenses: Not hard at all      Transportation Needs: No Transportation Needs (6/12/2025)    PRAPARE - Transportation     Lack of Transportation (Medical): No     Lack of Transportation (Non-Medical): No      Food Insecurity: No Food Insecurity (6/12/2025)    Hunger Vital Sign     Worried About Running Out of Food in the Last Year: Never true     Ran Out of Food in the Last Year: Never true        Encounter Vitals  Temperature: 36.4 °C (97.6 °F)  Blood Pressure : 128/66  Pulse: (!) 56  Pulse Oximetry: 99 %  Weight: 85.3 kg (188 lb)  Height: 182.9 cm (6')  BMI (Calculated): 25.5  Pain Score: No pain     ROS:  No fever, chills, nausea, vomiting, diarrhea, chest pain or shortness of breath. See HPI.    Physical Exam  Vitals reviewed.   Constitutional:       Appearance: Normal appearance.   Cardiovascular:      Rate and Rhythm: Normal rate and regular rhythm.      Pulses: Normal pulses.      Heart sounds: Normal heart sounds.   Pulmonary:      Effort: Pulmonary effort is normal.      Breath sounds: Normal breath sounds.   Skin:     General: Skin is warm and dry.      Capillary Refill: Capillary refill takes less than 2 seconds.   Neurological:      General: No focal deficit present.      Mental Status: He is alert and oriented to person, place, and time.   Psychiatric:         Mood and Affect: Mood normal.       Assessment and Plan. The following treatment and monitoring plan is recommended:    Dyslipidemia associated with type 2 diabetes mellitus (HCC)  Chronic, ongoing. Reviewed lipid profile from November 2024. No current lipid-lowering medication use. Provided education on following a heart healthy diet with adequate intake of fruits, vegetables, and whole grains. Encourage 30 minutes of moderate exercise 3-4 times a week.  Provided [employer] gym resources. Denies chest pain and claudication. Routinely monitored by primary care provider.        Component  Ref Range & Units (hover) 7 mo ago   Cholesterol,Tot 211 High    Triglycerides 105   HDL 44    High           Type 2 diabetes mellitus with hyperglycemia, with long-term current use of insulin (HCC)  Chronic, stable. Most recent hemoglobin A1C was 6.8 in February 2025. Currently taking metformin 500 mg twice daily and tirzepatide weekly. Routinely monitored by primary care provider.    Encounter for colorectal cancer screening  Cologuard ordered.        Services suggested: No services needed at this time  Health Care Screening: Age-appropriate preventive services recommended by USPTF and ACIP covered by Medicare were discussed today. Services ordered if indicated and agreed upon by the patient.  Referrals offered: Community-based lifestyle interventions to reduce health risks and promote self-management and wellness, fall prevention, nutrition, physical activity, tobacco-use cessation, weight loss, and mental health services as per orders if indicated.    Discussion today about general wellness and lifestyle habits:    Prevent falls and reduce trip hazards; Cautioned about securing or removing rugs.  Have a working fire alarm and carbon monoxide detector.  Engage in regular physical activity and social activities.    Follow-up: Return for appointment with Primary Care Provider as needed.              [1]   Current Outpatient Medications   Medication Sig Dispense Refill    Tirzepatide (MOUNJARO) 5 MG/0.5ML Solution Auto-injector Inject 5 mg under the skin every 7 days. 6 mL 2    metFORMIN ER (GLUCOPHAGE XR) 500 MG TABLET SR 24 HR Take 1 Tablet by mouth 2 times a day. 180 Tablet 3     No current facility-administered medications for this visit.   [2]   Social History  Tobacco Use    Smoking status: Never    Smokeless tobacco: Never   Vaping Use    Vaping status: Never Used    Substance Use Topics    Alcohol use: Yes     Alcohol/week: 3.0 oz     Types: 5 Standard drinks or equivalent per week     Comment: occ    Drug use: No   [3]   Past Surgical History:  Procedure Laterality Date    PB SHLDR ARTHROSCOP,SURG,W/ROTAT CUFF REPB Right 4/25/2024    Procedure: RIGHT SHOULDER ARTHROSCOPY ROTATOR CUFF REPAIR, RIGHT BICEPS TENODESIS, RIGHT SUBACROMIAL DECOMPRESSION, RIGHT DISTAL CLAVICLE RESECTION, REPAIRS AS INDICATED;  Surgeon: Pavan Foley M.D.;  Location: [facility];  Service: Orthopedics    KNEE REPLACEMENT, TOTAL Right 2021    SHOULDER ARTHROSCOPY W/ ROTATOR CUFF REPAIR      2020

## 2025-06-27 LAB — NONINV COLON CA DNA+OCC BLD SCRN STL QL: POSITIVE

## 2025-06-30 ENCOUNTER — RESULTS FOLLOW-UP (OUTPATIENT)
Dept: MEDICAL GROUP | Facility: MEDICAL CENTER | Age: 75
End: 2025-06-30

## 2025-07-08 NOTE — Clinical Note
REFERRAL APPROVAL NOTICE         Sent on July 8, 2025                   Memo Padron  [address] [place] [place] [address]                   Dear Mr. Padron,    After a careful review of the medical information and benefit coverage, [facility] has processed your referral. See below for additional details.    If applicable, you must be actively enrolled with your insurance for coverage of the authorized service. If you have any questions regarding your coverage, please contact your insurance directly.    REFERRAL INFORMATION   Referral #:  [id number]  Referred-To Provider    Referred-By Provider:  Gastroenterology    DAVIN Bal   [facility]      [address] [place] [address] [address] [place] [address]  [phone] [address] [facility] [place] [address]  [phone]    Referral Start Date:  06/30/2025  Referral End Date:   06/30/2026             SCHEDULING  If you do not already have an appointment, please call [phone] to make an appointment.     MORE INFORMATION  If you do not already have a [employer] account, sign up at: [email or web address]  You can access your medical information, make appointments, see lab results, billing information, and more.  If you have questions regarding this referral, please contact  the [facility] Referrals department at:             [phone]. Monday - Friday 8:00AM - 5:00PM.     Sincerely,    [facility]

## 2025-08-07 ENCOUNTER — HOSPITAL ENCOUNTER (OUTPATIENT)
Dept: LAB | Facility: MEDICAL CENTER | Age: 75
End: 2025-08-07
Payer: MEDICARE

## 2025-08-07 DIAGNOSIS — I15.2 HYPERTENSION ASSOCIATED WITH TYPE 2 DIABETES MELLITUS (HCC): ICD-10-CM

## 2025-08-07 DIAGNOSIS — E11.69 DYSLIPIDEMIA ASSOCIATED WITH TYPE 2 DIABETES MELLITUS (HCC): ICD-10-CM

## 2025-08-07 DIAGNOSIS — E11.59 HYPERTENSION ASSOCIATED WITH TYPE 2 DIABETES MELLITUS (HCC): ICD-10-CM

## 2025-08-07 DIAGNOSIS — E11.65 TYPE 2 DIABETES MELLITUS WITH HYPERGLYCEMIA, WITH LONG-TERM CURRENT USE OF INSULIN (HCC): ICD-10-CM

## 2025-08-07 DIAGNOSIS — E78.5 DYSLIPIDEMIA ASSOCIATED WITH TYPE 2 DIABETES MELLITUS (HCC): ICD-10-CM

## 2025-08-07 DIAGNOSIS — Z79.4 TYPE 2 DIABETES MELLITUS WITH HYPERGLYCEMIA, WITH LONG-TERM CURRENT USE OF INSULIN (HCC): ICD-10-CM

## 2025-08-07 LAB
ALBUMIN SERPL BCP-MCNC: 4.1 G/DL (ref 3.2–4.9)
ALBUMIN/GLOB SERPL: 1.5 G/DL
ALP SERPL-CCNC: 73 U/L (ref 30–99)
ALT SERPL-CCNC: 23 U/L (ref 2–50)
ANION GAP SERPL CALC-SCNC: 11 MMOL/L (ref 7–16)
AST SERPL-CCNC: 23 U/L (ref 12–45)
BILIRUB SERPL-MCNC: 0.4 MG/DL (ref 0.1–1.5)
BUN SERPL-MCNC: 14 MG/DL (ref 8–22)
CALCIUM ALBUM COR SERPL-MCNC: 9.2 MG/DL (ref 8.5–10.5)
CALCIUM SERPL-MCNC: 9.3 MG/DL (ref 8.5–10.5)
CHLORIDE SERPL-SCNC: 108 MMOL/L (ref 96–112)
CHOLEST SERPL-MCNC: 167 MG/DL (ref 100–199)
CO2 SERPL-SCNC: 22 MMOL/L (ref 20–33)
CREAT SERPL-MCNC: 0.98 MG/DL (ref 0.5–1.4)
CREAT UR-MCNC: 185 MG/DL
EST. AVERAGE GLUCOSE BLD GHB EST-MCNC: 117 MG/DL
FASTING STATUS PATIENT QL REPORTED: NORMAL
GFR SERPLBLD CREATININE-BSD FMLA CKD-EPI: 81 ML/MIN/1.73 M 2
GLOBULIN SER CALC-MCNC: 2.7 G/DL (ref 1.9–3.5)
GLUCOSE SERPL-MCNC: 101 MG/DL (ref 65–99)
HBA1C MFR BLD: 5.7 % (ref 4–5.6)
HDLC SERPL-MCNC: 45 MG/DL
LDLC SERPL CALC-MCNC: 102 MG/DL
MICROALBUMIN UR-MCNC: <1.2 MG/DL
MICROALBUMIN/CREAT UR: NORMAL MG/G (ref 0–30)
POTASSIUM SERPL-SCNC: 4.8 MMOL/L (ref 3.6–5.5)
PROT SERPL-MCNC: 6.8 G/DL (ref 6–8.2)
SODIUM SERPL-SCNC: 141 MMOL/L (ref 135–145)
TRIGL SERPL-MCNC: 98 MG/DL (ref 0–149)

## 2025-08-07 PROCEDURE — 83036 HEMOGLOBIN GLYCOSYLATED A1C: CPT

## 2025-08-07 PROCEDURE — 82570 ASSAY OF URINE CREATININE: CPT

## 2025-08-07 PROCEDURE — 80061 LIPID PANEL: CPT

## 2025-08-07 PROCEDURE — 80053 COMPREHEN METABOLIC PANEL: CPT

## 2025-08-07 PROCEDURE — 82043 UR ALBUMIN QUANTITATIVE: CPT

## 2025-08-07 PROCEDURE — 36415 COLL VENOUS BLD VENIPUNCTURE: CPT

## 2025-08-11 ENCOUNTER — OFFICE VISIT (OUTPATIENT)
Dept: MEDICAL GROUP | Facility: PHYSICIAN GROUP | Age: 75
End: 2025-08-11
Payer: MEDICARE

## 2025-08-11 VITALS
WEIGHT: 182.2 LBS | BODY MASS INDEX: 24.68 KG/M2 | OXYGEN SATURATION: 97 % | HEART RATE: 60 BPM | HEIGHT: 72 IN | TEMPERATURE: 98 F | DIASTOLIC BLOOD PRESSURE: 70 MMHG | SYSTOLIC BLOOD PRESSURE: 130 MMHG

## 2025-08-11 DIAGNOSIS — L71.9 ROSACEA: ICD-10-CM

## 2025-08-11 DIAGNOSIS — G57.91 NEUROPATHY OF RIGHT LOWER EXTREMITY: ICD-10-CM

## 2025-08-11 DIAGNOSIS — Z00.00 HEALTHCARE MAINTENANCE: ICD-10-CM

## 2025-08-11 DIAGNOSIS — R19.5 POSITIVE COLORECTAL CANCER SCREENING USING COLOGUARD TEST: Primary | ICD-10-CM

## 2025-08-11 DIAGNOSIS — Z79.4 TYPE 2 DIABETES MELLITUS WITH HYPERGLYCEMIA, WITH LONG-TERM CURRENT USE OF INSULIN (HCC): ICD-10-CM

## 2025-08-11 DIAGNOSIS — E11.69 DYSLIPIDEMIA ASSOCIATED WITH TYPE 2 DIABETES MELLITUS (HCC): ICD-10-CM

## 2025-08-11 DIAGNOSIS — E78.5 DYSLIPIDEMIA ASSOCIATED WITH TYPE 2 DIABETES MELLITUS (HCC): ICD-10-CM

## 2025-08-11 DIAGNOSIS — E11.65 TYPE 2 DIABETES MELLITUS WITH HYPERGLYCEMIA, WITH LONG-TERM CURRENT USE OF INSULIN (HCC): ICD-10-CM

## 2025-08-11 PROCEDURE — 3075F SYST BP GE 130 - 139MM HG: CPT

## 2025-08-11 PROCEDURE — 3078F DIAST BP <80 MM HG: CPT

## 2025-08-11 PROCEDURE — 99214 OFFICE O/P EST MOD 30 MIN: CPT

## 2025-08-11 RX ORDER — DESONIDE 0.5 MG/G
1 CREAM TOPICAL 2 TIMES DAILY
Qty: 15 G | Refills: 0 | Status: SHIPPED | OUTPATIENT
Start: 2025-08-11

## 2025-08-11 RX ORDER — METRONIDAZOLE TOPICAL 7.5 MG/G
1 GEL TOPICAL 2 TIMES DAILY
Qty: 45 G | Refills: 0 | Status: SHIPPED | OUTPATIENT
Start: 2025-08-11

## 2025-08-11 RX ORDER — TIRZEPATIDE 5 MG/.5ML
5 INJECTION, SOLUTION SUBCUTANEOUS
Qty: 6 ML | Refills: 2 | Status: SHIPPED | OUTPATIENT
Start: 2025-08-11

## 2025-08-11 ASSESSMENT — ENCOUNTER SYMPTOMS
FEVER: 0
COUGH: 0
CONSTIPATION: 0
WEAKNESS: 0
HEADACHES: 0
DIARRHEA: 0
NAUSEA: 0
DIZZINESS: 0
VOMITING: 0
WEIGHT LOSS: 0
CHILLS: 0
TINGLING: 1
BLURRED VISION: 0
ABDOMINAL PAIN: 0
MYALGIAS: 0
SHORTNESS OF BREATH: 0